# Patient Record
Sex: FEMALE | Race: WHITE | NOT HISPANIC OR LATINO | Employment: FULL TIME | URBAN - NONMETROPOLITAN AREA
[De-identification: names, ages, dates, MRNs, and addresses within clinical notes are randomized per-mention and may not be internally consistent; named-entity substitution may affect disease eponyms.]

---

## 2021-02-12 ENCOUNTER — OFFICE VISIT (OUTPATIENT)
Dept: CARDIOLOGY | Facility: CLINIC | Age: 46
End: 2021-02-12

## 2021-02-12 VITALS
WEIGHT: 146.6 LBS | TEMPERATURE: 98 F | BODY MASS INDEX: 25.03 KG/M2 | DIASTOLIC BLOOD PRESSURE: 100 MMHG | HEIGHT: 64 IN | SYSTOLIC BLOOD PRESSURE: 144 MMHG | HEART RATE: 78 BPM

## 2021-02-12 DIAGNOSIS — R42 DIZZINESS: ICD-10-CM

## 2021-02-12 DIAGNOSIS — I10 ESSENTIAL HYPERTENSION: Primary | ICD-10-CM

## 2021-02-12 DIAGNOSIS — E78.5 HYPERLIPIDEMIA LDL GOAL <100: ICD-10-CM

## 2021-02-12 DIAGNOSIS — R06.02 SHORTNESS OF BREATH: ICD-10-CM

## 2021-02-12 PROCEDURE — 99204 OFFICE O/P NEW MOD 45 MIN: CPT | Performed by: NURSE PRACTITIONER

## 2021-02-12 PROCEDURE — 93000 ELECTROCARDIOGRAM COMPLETE: CPT | Performed by: NURSE PRACTITIONER

## 2021-02-12 RX ORDER — HYDROCHLOROTHIAZIDE 25 MG/1
TABLET ORAL
Qty: 90 TABLET | Refills: 3 | Status: SHIPPED | OUTPATIENT
Start: 2021-02-12 | End: 2021-11-17

## 2021-02-12 RX ORDER — ERGOCALCIFEROL 1.25 MG/1
50000 CAPSULE ORAL WEEKLY
COMMUNITY
End: 2021-08-17

## 2021-02-12 RX ORDER — IBUPROFEN 200 MG
200 TABLET ORAL EVERY 6 HOURS PRN
COMMUNITY
End: 2022-09-21

## 2021-02-12 NOTE — PATIENT INSTRUCTIONS
"Hypertension, Adult  High blood pressure (hypertension) is when the force of blood pumping through the arteries is too strong. The arteries are the blood vessels that carry blood from the heart throughout the body. Hypertension forces the heart to work harder to pump blood and may cause arteries to become narrow or stiff. Untreated or uncontrolled hypertension can cause a heart attack, heart failure, a stroke, kidney disease, and other problems.  A blood pressure reading consists of a higher number over a lower number. Ideally, your blood pressure should be below 120/80. The first (\"top\") number is called the systolic pressure. It is a measure of the pressure in your arteries as your heart beats. The second (\"bottom\") number is called the diastolic pressure. It is a measure of the pressure in your arteries as the heart relaxes.  What are the causes?  The exact cause of this condition is not known. There are some conditions that result in or are related to high blood pressure.  What increases the risk?  Some risk factors for high blood pressure are under your control. The following factors may make you more likely to develop this condition:  · Smoking.  · Having type 2 diabetes mellitus, high cholesterol, or both.  · Not getting enough exercise or physical activity.  · Being overweight.  · Having too much fat, sugar, calories, or salt (sodium) in your diet.  · Drinking too much alcohol.  Some risk factors for high blood pressure may be difficult or impossible to change. Some of these factors include:  · Having chronic kidney disease.  · Having a family history of high blood pressure.  · Age. Risk increases with age.  · Race. You may be at higher risk if you are .  · Gender. Men are at higher risk than women before age 45. After age 65, women are at higher risk than men.  · Having obstructive sleep apnea.  · Stress.  What are the signs or symptoms?  High blood pressure may not cause symptoms. Very high " blood pressure (hypertensive crisis) may cause:  · Headache.  · Anxiety.  · Shortness of breath.  · Nosebleed.  · Nausea and vomiting.  · Vision changes.  · Severe chest pain.  · Seizures.  How is this diagnosed?  This condition is diagnosed by measuring your blood pressure while you are seated, with your arm resting on a flat surface, your legs uncrossed, and your feet flat on the floor. The cuff of the blood pressure monitor will be placed directly against the skin of your upper arm at the level of your heart. It should be measured at least twice using the same arm. Certain conditions can cause a difference in blood pressure between your right and left arms.  Certain factors can cause blood pressure readings to be lower or higher than normal for a short period of time:  · When your blood pressure is higher when you are in a health care provider's office than when you are at home, this is called white coat hypertension. Most people with this condition do not need medicines.  · When your blood pressure is higher at home than when you are in a health care provider's office, this is called masked hypertension. Most people with this condition may need medicines to control blood pressure.  If you have a high blood pressure reading during one visit or you have normal blood pressure with other risk factors, you may be asked to:  · Return on a different day to have your blood pressure checked again.  · Monitor your blood pressure at home for 1 week or longer.  If you are diagnosed with hypertension, you may have other blood or imaging tests to help your health care provider understand your overall risk for other conditions.  How is this treated?  This condition is treated by making healthy lifestyle changes, such as eating healthy foods, exercising more, and reducing your alcohol intake. Your health care provider may prescribe medicine if lifestyle changes are not enough to get your blood pressure under control, and  if:  · Your systolic blood pressure is above 130.  · Your diastolic blood pressure is above 80.  Your personal target blood pressure may vary depending on your medical conditions, your age, and other factors.  Follow these instructions at home:  Eating and drinking    · Eat a diet that is high in fiber and potassium, and low in sodium, added sugar, and fat. An example eating plan is called the DASH (Dietary Approaches to Stop Hypertension) diet. To eat this way:  ? Eat plenty of fresh fruits and vegetables. Try to fill one half of your plate at each meal with fruits and vegetables.  ? Eat whole grains, such as whole-wheat pasta, brown rice, or whole-grain bread. Fill about one fourth of your plate with whole grains.  ? Eat or drink low-fat dairy products, such as skim milk or low-fat yogurt.  ? Avoid fatty cuts of meat, processed or cured meats, and poultry with skin. Fill about one fourth of your plate with lean proteins, such as fish, chicken without skin, beans, eggs, or tofu.  ? Avoid pre-made and processed foods. These tend to be higher in sodium, added sugar, and fat.  · Reduce your daily sodium intake. Most people with hypertension should eat less than 1,500 mg of sodium a day.  · Do not drink alcohol if:  ? Your health care provider tells you not to drink.  ? You are pregnant, may be pregnant, or are planning to become pregnant.  · If you drink alcohol:  ? Limit how much you use to:  § 0-1 drink a day for women.  § 0-2 drinks a day for men.  ? Be aware of how much alcohol is in your drink. In the [place] one drink equals one 12 oz bottle of beer (355 mL), one 5 oz glass of wine (148 mL), or one 1½ oz glass of hard liquor (44 mL).  Lifestyle    · Work with your health care provider to maintain a healthy body weight or to lose weight. Ask what an ideal weight is for you.  · Get at least 30 minutes of exercise most days of the week. Activities may include walking, swimming, or biking.  · Include exercise to  strengthen your muscles (resistance exercise), such as Pilates or lifting weights, as part of your weekly exercise routine. Try to do these types of exercises for 30 minutes at least 3 days a week.  · Do not use any products that contain nicotine or tobacco, such as cigarettes, e-cigarettes, and chewing tobacco. If you need help quitting, ask your health care provider.  · Monitor your blood pressure at home as told by your health care provider.  · Keep all follow-up visits as told by your health care provider. This is important.  Medicines  · Take over-the-counter and prescription medicines only as told by your health care provider. Follow directions carefully. Blood pressure medicines must be taken as prescribed.  · Do not skip doses of blood pressure medicine. Doing this puts you at risk for problems and can make the medicine less effective.  · Ask your health care provider about side effects or reactions to medicines that you should watch for.  Contact a health care provider if you:  · Think you are having a reaction to a medicine you are taking.  · Have headaches that keep coming back (recurring).  · Feel dizzy.  · Have swelling in your ankles.  · Have trouble with your vision.  Get help right away if you:  · Develop a severe headache or confusion.  · Have unusual weakness or numbness.  · Feel faint.  · Have severe pain in your chest or abdomen.  · Vomit repeatedly.  · Have trouble breathing.  Summary  · Hypertension is when the force of blood pumping through your arteries is too strong. If this condition is not controlled, it may put you at risk for serious complications.  · Your personal target blood pressure may vary depending on your medical conditions, your age, and other factors. For most people, a normal blood pressure is less than 120/80.  · Hypertension is treated with lifestyle changes, medicines, or a combination of both. Lifestyle changes include losing weight, eating a healthy, low-sodium diet,  exercising more, and limiting alcohol.  This information is not intended to replace advice given to you by your health care provider. Make sure you discuss any questions you have with your health care provider.  Document Revised: 08/28/2019 Document Reviewed: 08/28/2019  Elsevier Patient Education © 2020 [employer]  Mediterranean Diet  A Mediterranean diet refers to food and lifestyle choices that are based on the traditions of countries located on the [place]. This way of eating has been shown to help prevent certain conditions and improve outcomes for people who have chronic diseases, like kidney disease and heart disease.  What are tips for following this plan?  Lifestyle  · Cook and eat meals together with your family, when possible.  · Drink enough fluid to keep your urine clear or pale yellow.  · Be physically active every day. This includes:  ? Aerobic exercise like running or swimming.  ? Leisure activities like gardening, walking, or housework.  · Get 7-8 hours of sleep each night.  · If recommended by your health care provider, drink red wine in moderation. This means 1 glass a day for nonpregnant women and 2 glasses a day for men. A glass of wine equals 5 oz (150 mL).  Reading food labels    · Check the serving size of packaged foods. For foods such as rice and pasta, the serving size refers to the amount of cooked product, not dry.  · Check the total fat in packaged foods. Avoid foods that have saturated fat or trans fats.  · Check the ingredients list for added sugars, such as corn syrup.  Shopping  · At the grocery store, buy most of your food from the areas near the walls of the store. This includes:  ? Fresh fruits and vegetables (produce).  ? Grains, beans, nuts, and seeds. Some of these may be available in unpackaged forms or large amounts (in bulk).  ? Fresh seafood.  ? Poultry and eggs.  ? Low-fat dairy products.  · Buy whole ingredients instead of prepackaged foods.  · Buy fresh  fruits and vegetables in-season from local farmers markets.  · Buy frozen fruits and vegetables in resealable bags.  · If you do not have access to quality fresh seafood, buy precooked frozen shrimp or canned fish, such as tuna, salmon, or sardines.  · Buy small amounts of raw or cooked vegetables, salads, or olives from the deli or salad bar at your store.  · Stock your pantry so you always have certain foods on hand, such as olive oil, canned tuna, canned tomatoes, rice, pasta, and beans.  Cooking  · Cook foods with extra-virgin olive oil instead of using butter or other vegetable oils.  · Have meat as a side dish, and have vegetables or grains as your main dish. This means having meat in small portions or adding small amounts of meat to foods like pasta or stew.  · Use beans or vegetables instead of meat in common dishes like chili or lasagna.  · [place] with different cooking methods. Try roasting or broiling vegetables instead of steaming or sautéeing them.  · Add frozen vegetables to soups, stews, pasta, or rice.  · Add nuts or seeds for added healthy fat at each meal. You can add these to yogurt, salads, or vegetable dishes.  · Marinate fish or vegetables using olive oil, lemon juice, garlic, and fresh herbs.  Meal planning    · Plan to eat 1 vegetarian meal one day each week. Try to work up to 2 vegetarian meals, if possible.  · Eat seafood 2 or more times a week.  · Have healthy snacks readily available, such as:  ? Vegetable sticks with hummus.  ? Greek yogurt.  ? Fruit and nut trail mix.  · Eat balanced meals throughout the week. This includes:  ? Fruit: 2-3 servings a day  ? Vegetables: 4-5 servings a day  ? Low-fat dairy: 2 servings a day  ? Fish, poultry, or lean meat: 1 serving a day  ? Beans and legumes: 2 or more servings a week  ? Nuts and seeds: 1-2 servings a day  ? Whole grains: 6-8 servings a day  ? Extra-virgin olive oil: 3-4 servings a day  · Limit red meat and sweets to only a few  servings a month  What are my food choices?  · Mediterranean diet  ? Recommended  § Grains: Whole-grain pasta. Brown rice. Bulgar wheat. Polenta. Couscous. Whole-wheat bread. Oatmeal. Quinoa.  § Vegetables: Artichokes. Beets. Broccoli. Cabbage. Carrots. Eggplant. Green beans. Chard. Kale. Spinach. Onions. Leeks. Peas. Squash. Tomatoes. Peppers. Radishes.  § Fruits: Apples. Apricots. Avocado. Berries. Bananas. Cherries. Dates. Figs. Grapes. Zev. Melon. Oranges. Peaches. Plums. Pomegranate.  § Meats and other protein foods: Beans. Almonds. Sunflower seeds. Pine nuts. Peanuts. Cod. [place]. Scallops. Shrimp. Tuna. Tilapia. Clams. Oysters. Eggs.  § Dairy: Low-fat milk. Cheese. Greek yogurt.  § Beverages: Water. Red wine. Herbal tea.  § Fats and oils: Extra virgin olive oil. Avocado oil. Grape seed oil.  § Sweets and desserts: Greek yogurt with honey. Baked apples. Poached pears. Trail mix.  § Seasoning and other foods: Basil. Cilantro. Coriander. Cumin. Mint. Parsley. [place]. Rosemary. Tarragon. Garlic. Oregano. Thyme. Pepper. Balsalmic vinegar. Tahini. Hummus. Tomato sauce. Olives. Mushrooms.  ? Limit these  § Grains: Prepackaged pasta or rice dishes. Prepackaged cereal with added sugar.  § Vegetables: Deep fried potatoes (french fries).  § Fruits: Fruit canned in syrup.  § Meats and other protein foods: Beef. Pork. Lamb. Poultry with skin. Hot dogs. [place].  § Dairy: Ice cream. Sour cream. Whole milk.  § Beverages: Juice. Sugar-sweetened soft drinks. Beer. Liquor and spirits.  § Fats and oils: Butter. Canola oil. Vegetable oil. Beef fat (tallow). Lard.  § Sweets and desserts: Cookies. Cakes. Pies. Candy.  § Seasoning and other foods: Mayonnaise. Premade sauces and marinades.  The items listed may not be a complete list. Talk with your dietitian about what dietary choices are right for you.  Summary  · The Mediterranean diet includes both food and lifestyle choices.  · Eat a variety of fresh fruits and vegetables,  beans, nuts, seeds, and whole grains.  · Limit the amount of red meat and sweets that you eat.  · Talk with your health care provider about whether it is safe for you to drink red wine in moderation. This means 1 glass a day for nonpregnant women and 2 glasses a day for men. A glass of wine equals 5 oz (150 mL).  This information is not intended to replace advice given to you by your health care provider. Make sure you discuss any questions you have with your health care provider.  Document Revised: 08/17/2017 Document Reviewed: 08/10/2017  Elsevier Patient Education © 2020 [employer] Inc[employer]

## 2021-02-12 NOTE — PROGRESS NOTES
"Subjective   Yasmine Berry is a 45 y.o. female     Chief Complaint   Patient presents with   • Establish Care     Patient referred per PCP for new onset of HTN and dizziness. No prior cardiac testing.   • Dizziness     Started noticing about 2 weeks ago, she started monitoring B/P and noticed elevation 130s/94 with average B/P 130/80s. Normal B/P for her 120/70. Having occasional headaches, has been taking Motrin 1-2 times daily.   • Shortness of Breath     Notices when walking up and down stairs and over exertion.   • Aspirin     patient does not take.     Initial cardiac evaluation;    HPI    Ms. Yasmine Berry came in today for her initial cardiac evaluation.  She has no significant  past cardiac history who recently noticed blood pressure increasing. She has scoliosis and recently having more back pain she has treated with once to twice daily ibuprofen. Approximately two weeks ago, she was standing in her kitchen talking to her  when she felt sudden onset dizziness, \"like I was on a [id number]\". Symptoms lasted only a few seconds then subsided. Blood pressure at that time 130's/80's. Since then, she has had a couple more episodes of dizziness with bending over. Denies palpitations, blurred vision, TIA, syncope. Blood pressure readings x 2 weeks range 130-140/70-90. HR in the 70's. She denies having any chest discomfort, diaphoresis, orthopnea, edema. She does note very mild shortness of breath in exertion, occasional snoring and difficulty staying asleep due to stress but no significant symptoms of sleep apnea. Labs per PCP 2/5/21: CMP, CBC normal, TSH 1.42, normal T3, T4, , Tri 76, HDL 49, . Insulin level normal. She is a lifelong nonsmoker. Family history significant for HTN, hyperlipidemia, CAD in her father and TIA in her mother.      Cardiac History  No past surgical history on file.    Current Outpatient Medications   Medication Sig Dispense Refill   • ibuprofen (ADVIL,MOTRIN) 200 MG " tablet Take 200 mg by mouth Every 6 (Six) Hours As Needed for Mild Pain .     • vitamin D (ERGOCALCIFEROL) 1.25 MG (47091 UT) capsule capsule Take 50,000 Units by mouth 1 (One) Time Per Week.     • hydroCHLOROthiazide (HYDRODIURIL) 25 MG tablet Start 1/2 tablet once daily 90 tablet 3     No current facility-administered medications for this visit.      Patient has no known allergies.    Past Medical History:   Diagnosis Date   • Hypertension    • Plantar plate injury, right, initial encounter    • Vitamin D deficiency      Social History     Socioeconomic History   • Marital status:      Spouse name: Not on file   • Number of children: Not on file   • Years of education: Not on file   • Highest education level: Not on file   Tobacco Use   • Smoking status: Never Smoker   • Smokeless tobacco: Never Used   Substance and Sexual Activity   • Alcohol use: Yes     Alcohol/week: 2.0 standard drinks     Types: 2 Glasses of wine per week   • Drug use: Never     Family History   Problem Relation Age of Onset   • Hypertension Mother    • Hyperlipidemia Mother    • Transient ischemic attack Mother 60   • Hypertension Father    • Heart disease Father 50        stent placement   • No Known Problems Sister    • Asthma Brother    • Hypertension Maternal Grandmother    • Diabetes Paternal Grandmother    • Hypertension Paternal Grandmother    • Hyperlipidemia Paternal Grandmother    • Heart attack Paternal Grandfather    • No Known Problems Son    • No Known Problems Daughter      Review of Systems   Constitutional: Negative for activity change, appetite change, fatigue and unexpected weight change.   HENT: Negative for congestion and sinus pressure.    Eyes: Negative.    Respiratory: Negative.    Cardiovascular: Negative.    Gastrointestinal: Negative.    Endocrine: Negative.    Genitourinary: Negative.    Musculoskeletal: Positive for back pain (scoliosis ).   Allergic/Immunologic: Negative.    Neurological: Positive for  "dizziness, light-headedness and headaches. Negative for tremors, seizures, syncope and speech difficulty.   Hematological: Negative.    Psychiatric/Behavioral: Negative.      Diabetes- No  Thyroid- normal    Objective     /100 (BP Location: Left arm)   Pulse 78   Temp 98 °F (36.7 °C)   Ht 162.6 cm (64\")   Wt 66.5 kg (146 lb 9.6 oz)   BMI 25.16 kg/m²     Physical Exam  Vitals signs and nursing note reviewed.   Constitutional:       General: She is not in acute distress.     Appearance: Normal appearance. She is normal weight. She is not ill-appearing, toxic-appearing or diaphoretic.   HENT:      Head: Normocephalic and atraumatic.      Nose: Nose normal.      Mouth/Throat:      Mouth: Mucous membranes are moist.      Pharynx: Oropharynx is clear.   Eyes:      Pupils: Pupils are equal, round, and reactive to light.   Cardiovascular:      Rate and Rhythm: Normal rate and regular rhythm.      Pulses: Normal pulses.           Dorsalis pedis pulses are 2+ on the right side and 2+ on the left side.        Posterior tibial pulses are 2+ on the right side and 2+ on the left side.      Heart sounds: Murmur (1/6 mitral ) present. Systolic murmur present with a grade of 1/6.   Abdominal:      General: Bowel sounds are normal.   Musculoskeletal: Normal range of motion.         General: No swelling.   Skin:     General: Skin is warm and dry.   Neurological:      General: No focal deficit present.      Mental Status: She is alert and oriented to person, place, and time.   Psychiatric:         Mood and Affect: Mood normal.         Behavior: Behavior normal.         ECG 12 Lead    Date/Time: 2/12/2021 10:50 AM  Performed by: Michaela Baeza APRN  Authorized by: Michaela Baeza APRN   Comparison: not compared with previous ECG   Previous ECG: no previous ECG available  Rhythm: sinus rhythm  Rate: normal  BPM: 78  Conduction: incomplete right bundle branch block  ST Segments: ST segments normal    Clinical impression: normal " ECG and non-specific ECG  Comments:  ms  QRS 93 ms  QTc 418 ms           Assessment/Plan     Diagnoses and all orders for this visit:    1. Essential hypertension (Primary)  -     Adult Transthoracic Echo Complete W/ Cont if Necessary Per Protocol; Future  -     US Carotid Bilateral; Future    2. Dizziness  -     Adult Transthoracic Echo Complete W/ Cont if Necessary Per Protocol; Future  -     US Carotid Bilateral; Future    3. Hyperlipidemia LDL goal <100  -     US Carotid Bilateral; Future    4. Shortness of breath  -     Adult Transthoracic Echo Complete W/ Cont if Necessary Per Protocol; Future    Other orders  -     hydroCHLOROthiazide (HYDRODIURIL) 25 MG tablet; Start 1/2 tablet once daily  Dispense: 90 tablet; Refill: 3       Clinically appears stable with no acute changes on EKG. Blood pressure is elevated with home log reviewed.     1. HTN- blood pressure elevated today 140/100. Rechecked at end of visit, remained elevated. Kidney function is normal. Will start low dose thiazide diuretic, HCTZ 25 mg, take 1/2 tablet once daily. Monitor blood pressure. Keep log x 2 weeks and report readings for goal <130/80. This may help with SOB on exertion.  Echocardiogram ordered to evaluate for LVH, mitral regurgitation, and LVEF. If LVH is noted, she may need to be on low dose ace inhibitor. She is advised to avoid NSAID. Can use acetaminophen for musculoskeletal pain. Limit Na to 3661-5071 mg daily. Increase water intake.     2. Dizziness- may be related to blood pressure fluctuation vs vasovagal. Will check carotid US to evaluate carotid and vertebral flow. She denies palpitations and rhythm regular today, so I did not place holter at this time.     3. Hyperlipidemia- mild dyslipidemia with , normal HDL. Encouraged Mediterranean diet, low in saturated fats and carbohydrates, high in protein.     Further recommendations to follow echo, carotid US, and response to antihypertensive treatment. Follow up in  six months or sooner if needed.

## 2021-02-17 ENCOUNTER — HOSPITAL ENCOUNTER (OUTPATIENT)
Dept: CARDIOLOGY | Facility: HOSPITAL | Age: 46
End: 2021-02-17

## 2021-02-17 ENCOUNTER — APPOINTMENT (OUTPATIENT)
Dept: CARDIOLOGY | Facility: HOSPITAL | Age: 46
End: 2021-02-17

## 2021-02-24 ENCOUNTER — HOSPITAL ENCOUNTER (OUTPATIENT)
Dept: CARDIOLOGY | Facility: HOSPITAL | Age: 46
Discharge: HOME | End: 2021-02-24

## 2021-02-24 DIAGNOSIS — I10 ESSENTIAL HYPERTENSION: ICD-10-CM

## 2021-02-24 DIAGNOSIS — R42 DIZZINESS: ICD-10-CM

## 2021-02-24 DIAGNOSIS — R06.02 SHORTNESS OF BREATH: ICD-10-CM

## 2021-02-24 DIAGNOSIS — E78.5 HYPERLIPIDEMIA LDL GOAL <100: ICD-10-CM

## 2021-02-24 LAB
BH CV ECHO MEAS - ACS: 1.9 CM
BH CV ECHO MEAS - AO MAX PG: 6.4 MMHG
BH CV ECHO MEAS - AO MEAN PG: 4 MMHG
BH CV ECHO MEAS - AO ROOT AREA (BSA CORRECTED): 1.5
BH CV ECHO MEAS - AO ROOT AREA: 5.5 CM^2
BH CV ECHO MEAS - AO ROOT DIAM: 2.7 CM
BH CV ECHO MEAS - AO V2 MAX: 126 CM/SEC
BH CV ECHO MEAS - AO V2 MEAN: 89.2 CM/SEC
BH CV ECHO MEAS - AO V2 VTI: 30 CM
BH CV ECHO MEAS - BSA(HAYCOCK): 1.7 M^2
BH CV ECHO MEAS - BSA: 1.7 M^2
BH CV ECHO MEAS - BZI_BMI: 25.1 KILOGRAMS/M^2
BH CV ECHO MEAS - BZI_METRIC_HEIGHT: 162.6 CM
BH CV ECHO MEAS - BZI_METRIC_WEIGHT: 66.2 KG
BH CV ECHO MEAS - EDV(CUBED): 51.5 ML
BH CV ECHO MEAS - EDV(MOD-SP4): 81.9 ML
BH CV ECHO MEAS - EDV(TEICH): 58.9 ML
BH CV ECHO MEAS - EF(CUBED): 83.3 %
BH CV ECHO MEAS - EF(MOD-SP2): 76.9 %
BH CV ECHO MEAS - EF(MOD-SP4): 62.5 %
BH CV ECHO MEAS - EF(TEICH): 77 %
BH CV ECHO MEAS - ESV(CUBED): 8.6 ML
BH CV ECHO MEAS - ESV(MOD-SP4): 30.7 ML
BH CV ECHO MEAS - ESV(TEICH): 13.6 ML
BH CV ECHO MEAS - FS: 44.9 %
BH CV ECHO MEAS - IVS/LVPW: 1.1
BH CV ECHO MEAS - IVSD: 1.1 CM
BH CV ECHO MEAS - LA DIMENSION: 3.4 CM
BH CV ECHO MEAS - LA/AO: 1.3
BH CV ECHO MEAS - LV DIASTOLIC VOL/BSA (35-75): 47.9 ML/M^2
BH CV ECHO MEAS - LV IVRT: 0.11 SEC
BH CV ECHO MEAS - LV MASS(C)D: 130.4 GRAMS
BH CV ECHO MEAS - LV MASS(C)DI: 76.2 GRAMS/M^2
BH CV ECHO MEAS - LV SYSTOLIC VOL/BSA (12-30): 17.9 ML/M^2
BH CV ECHO MEAS - LVIDD: 3.7 CM
BH CV ECHO MEAS - LVIDS: 2.1 CM
BH CV ECHO MEAS - LVLD AP4: 7.3 CM
BH CV ECHO MEAS - LVLS AP4: 6.3 CM
BH CV ECHO MEAS - LVOT AREA (M): 3.5 CM^2
BH CV ECHO MEAS - LVOT AREA: 3.5 CM^2
BH CV ECHO MEAS - LVOT DIAM: 2.1 CM
BH CV ECHO MEAS - LVPWD: 1.1 CM
BH CV ECHO MEAS - MV A MAX VEL: 36 CM/SEC
BH CV ECHO MEAS - MV DEC SLOPE: 395 CM/SEC^2
BH CV ECHO MEAS - MV E MAX VEL: 100 CM/SEC
BH CV ECHO MEAS - MV E/A: 2.8
BH CV ECHO MEAS - RAP SYSTOLE: 10 MMHG
BH CV ECHO MEAS - RVDD: 2.8 CM
BH CV ECHO MEAS - RVSP: 27 MMHG
BH CV ECHO MEAS - SI(AO): 96.7 ML/M^2
BH CV ECHO MEAS - SI(CUBED): 25 ML/M^2
BH CV ECHO MEAS - SI(MOD-SP4): 29.9 ML/M^2
BH CV ECHO MEAS - SI(TEICH): 26.5 ML/M^2
BH CV ECHO MEAS - SV(AO): 165.5 ML
BH CV ECHO MEAS - SV(CUBED): 42.9 ML
BH CV ECHO MEAS - SV(MOD-SP4): 51.2 ML
BH CV ECHO MEAS - SV(TEICH): 45.3 ML
BH CV ECHO MEAS - TR MAX VEL: 206 CM/SEC
MAXIMAL PREDICTED HEART RATE: 175 BPM
STRESS TARGET HR: 149 BPM

## 2021-02-24 PROCEDURE — 93356 MYOCRD STRAIN IMG SPCKL TRCK: CPT | Performed by: INTERNAL MEDICINE

## 2021-02-24 PROCEDURE — 93306 TTE W/DOPPLER COMPLETE: CPT

## 2021-02-24 PROCEDURE — 93880 EXTRACRANIAL BILAT STUDY: CPT

## 2021-02-24 PROCEDURE — 93306 TTE W/DOPPLER COMPLETE: CPT | Performed by: INTERNAL MEDICINE

## 2021-02-24 PROCEDURE — 93880 EXTRACRANIAL BILAT STUDY: CPT | Performed by: RADIOLOGY

## 2021-02-24 PROCEDURE — 93356 MYOCRD STRAIN IMG SPCKL TRCK: CPT

## 2021-08-17 ENCOUNTER — OFFICE VISIT (OUTPATIENT)
Dept: CARDIOLOGY | Facility: CLINIC | Age: 46
End: 2021-08-17

## 2021-08-17 VITALS
WEIGHT: 132.8 LBS | BODY MASS INDEX: 22.67 KG/M2 | SYSTOLIC BLOOD PRESSURE: 120 MMHG | DIASTOLIC BLOOD PRESSURE: 80 MMHG | HEIGHT: 64 IN | HEART RATE: 68 BPM

## 2021-08-17 DIAGNOSIS — E78.5 DYSLIPIDEMIA: ICD-10-CM

## 2021-08-17 DIAGNOSIS — I10 ESSENTIAL HYPERTENSION: Primary | ICD-10-CM

## 2021-08-17 DIAGNOSIS — R42 DIZZINESS: ICD-10-CM

## 2021-08-17 PROBLEM — M41.9 SCOLIOSIS: Status: ACTIVE | Noted: 2021-08-17

## 2021-08-17 PROCEDURE — 99213 OFFICE O/P EST LOW 20 MIN: CPT | Performed by: NURSE PRACTITIONER

## 2021-08-17 NOTE — PROGRESS NOTES
Chief Complaint   Patient presents with   • Follow-up     Cardiac management   • Lab     Last labs in chart.   • Blood pressure     Doing better, 104/72.    • Dizziness     Doing much better.    • Med Refill     Does not need refills at this time.   • Weight loss     Has been watching diet closely and more active.      Subjective       Yasmine Berry is a 46 y.o. female with no significant past cardiac history who was referred for evaluation  in February 2021 secondary to dizziness and mildly elevated blood pressure.  She has scoliosis with back pain managed with ibuprofen.  She was encouraged to decrease NSAID use, decrease sodium intake.  HCTZ 12.5-25 mg prescribed for /100 at initial visit.  She underwent echocardiogram which showed no LVH, normal LVEF, no significant valvular dysfunction.  Carotid ultrasound showed no stenosis or tortuosity.  Mediterranean diet recommended for .    She returns today for follow-up.  Dizziness has improved.  She has been following a stricter diet with intermittent fasting, avoiding gluten and dairy and has lost 14 pounds.  She is also exercising with HIIT.  Blood pressure has been well controlled and she uses HCTZ 12.5 mg as needed.       Cardiac History:    Past Surgical History:   Procedure Laterality Date   • ECHO - CONVERTED  02/24/2021    EF 70%. Trace-Mild MR. RVSP- 27 mmHg     Current Outpatient Medications   Medication Sig Dispense Refill   • hydroCHLOROthiazide (HYDRODIURIL) 25 MG tablet Start 1/2 tablet once daily (Patient taking differently: Start 1/2 tablet once daily as needed) 90 tablet 3   • ibuprofen (ADVIL,MOTRIN) 200 MG tablet Take 200 mg by mouth Every 6 (Six) Hours As Needed for Mild Pain .       No current facility-administered medications for this visit.     Patient has no known allergies.    Past Medical History:   Diagnosis Date   • Hypertension    • Plantar plate injury, right, initial encounter    • Vitamin D deficiency      Social History  "    Socioeconomic History   • Marital status:      Spouse name: Not on file   • Number of children: Not on file   • Years of education: Not on file   • Highest education level: Not on file   Tobacco Use   • Smoking status: Never Smoker   • Smokeless tobacco: Never Used   Vaping Use   • Vaping Use: Never used   Substance and Sexual Activity   • Alcohol use: Yes     Alcohol/week: 2.0 standard drinks     Types: 2 Glasses of wine per week   • Drug use: Never     Family History   Problem Relation Age of Onset   • Hypertension Mother    • Hyperlipidemia Mother    • Transient ischemic attack Mother 60   • Hypertension Father    • Heart disease Father 50        stent placement   • No Known Problems Sister    • Asthma Brother    • Hypertension Maternal Grandmother    • Diabetes Paternal Grandmother    • Hypertension Paternal Grandmother    • Hyperlipidemia Paternal Grandmother    • Heart attack Paternal Grandfather    • No Known Problems Son    • No Known Problems Daughter      Review of Systems   Constitutional: Positive for weight loss (Down 14 pounds). Negative for decreased appetite and malaise/fatigue.   HENT: Negative.    Eyes: Negative for blurred vision.   Cardiovascular: Negative for chest pain, dyspnea on exertion, leg swelling, palpitations and syncope.   Respiratory: Negative for shortness of breath and sleep disturbances due to breathing.    Endocrine: Negative.    Hematologic/Lymphatic: Negative for bleeding problem. Does not bruise/bleed easily.   Skin: Negative.    Musculoskeletal: Negative for falls and myalgias.   Gastrointestinal: Negative for abdominal pain, heartburn and melena.   Genitourinary: Negative for hematuria.   Neurological: Positive for dizziness (Improved). Negative for light-headedness.   Psychiatric/Behavioral: Negative for altered mental status.   Allergic/Immunologic: Negative.       Objective     /80 (BP Location: Right arm)   Pulse 68   Ht 162.6 cm (64.02\")   Wt 60.2 kg " (132 lb 12.8 oz)   BMI 22.78 kg/m²     Vitals and nursing note reviewed.   Constitutional:       General: Not in acute distress.     Appearance: Well-developed. Not diaphoretic.   Eyes:      Pupils: Pupils are equal, round, and reactive to light.   HENT:      Head: Normocephalic.   Pulmonary:      Effort: Pulmonary effort is normal. No respiratory distress.      Breath sounds: Normal breath sounds.   Cardiovascular:      Normal rate. Regular rhythm.   Pulses:     Intact distal pulses.   Edema:     Peripheral edema absent.   Abdominal:      General: Bowel sounds are normal.      Palpations: Abdomen is soft.   Musculoskeletal: Normal range of motion.      Cervical back: Normal range of motion. Skin:     General: Skin is warm and dry.   Neurological:      Mental Status: Alert and oriented to person, place, and time.        Procedures          Problem List Items Addressed This Visit        Cardiac and Vasculature    Essential hypertension - Primary    Dyslipidemia       Symptoms and Signs    Dizziness         Blood pressure normal today at 120/80.  Continue HCTZ 12.5 mg as needed.    Lipids borderline elevated with , normal HDL.  Continue heart healthy diet.  She was congratulated on her weight loss.    Dizziness-improved.  Echocardiogram and carotid ultrasound reviewed with her showing no valvular heart disease, no carotid or vertebral obstruction.  If she develops recurrent symptoms, consider PT for vertigo management.    She appears stable.  No further work-up indicated at this time.  Follow-up in 1 year or sooner as needed.    Patient's Body mass index is 22.78 kg/m². indicating that she is within normal range (BMI 18.5-24.9). No BMI management plan needed..               Electronically signed by ANGE Guajardo,  August 17, 2021 10:36 EDT

## 2021-11-17 ENCOUNTER — OFFICE VISIT (OUTPATIENT)
Dept: OBSTETRICS AND GYNECOLOGY | Facility: CLINIC | Age: 46
End: 2021-11-17

## 2021-11-17 VITALS
DIASTOLIC BLOOD PRESSURE: 82 MMHG | WEIGHT: 132.2 LBS | BODY MASS INDEX: 22.57 KG/M2 | SYSTOLIC BLOOD PRESSURE: 138 MMHG | HEIGHT: 64 IN

## 2021-11-17 DIAGNOSIS — N63.0 BREAST MASS IN FEMALE: Primary | ICD-10-CM

## 2021-11-17 PROCEDURE — 99213 OFFICE O/P EST LOW 20 MIN: CPT | Performed by: NURSE PRACTITIONER

## 2021-11-17 NOTE — PROGRESS NOTES
"Chief Complaint  Yasmine Berry is a 46 y.o.  female presenting for Breast Problem (Establish care.  patient with an area of puffiness/soreness in the right breast.)    History of Present Illness  Very pleasant longtime pt presents for a problem visit.  She is 45yo,  who had normal contrast enhanced mammo SEPT .  Feels new palpable mass in the Right UOQ/ axilla.  LMP 2021.  Vas for BC.      The following portions of the patient's history were reviewed and updated as appropriate: allergies, current medications, past family history, past medical history, past social history, past surgical history and problem list.    No Known Allergies      Current Outpatient Medications:   •  hydroCHLOROthiazide (HYDRODIURIL) 25 MG tablet, Start 1/2 tablet once daily (Patient taking differently: Start 1/2 tablet once daily as needed), Disp: 90 tablet, Rfl: 3  •  ibuprofen (ADVIL,MOTRIN) 200 MG tablet, Take 200 mg by mouth Every 6 (Six) Hours As Needed for Mild Pain ., Disp: , Rfl:     Past Medical History:   Diagnosis Date   • Hypertension    • Plantar plate injury, right, initial encounter    • Vitamin D deficiency         Past Surgical History:   Procedure Laterality Date   • ECHO - CONVERTED  2021    EF 70%. Trace-Mild MR. RVSP- 27 mmHg   • PLANTAR FASCIA SURGERY Right        Objective  /82   Ht 162.6 cm (64\")   Wt 60 kg (132 lb 3.2 oz)   LMP 2021 (Exact Date)   Breastfeeding No   BMI 22.69 kg/m²     Physical Exam    Left breast without masses, nipple dc, or lymphadenopathy  Right breast with a soft and mobile, but somewhat ill-defined mass in the UOQ extending into the axilla.  No nipple dc.  No lymphadenopathy.    Assessment/Plan   Diagnoses and all orders for this visit:    1. Breast mass in female (Primary)  -     Mammo Diagnostic Digital Tomosynthesis Right With CAD; Future      Will contact [facility] (who has done all previous [facility]) and see if she can be worked in " today).  Procedures        Return in about 3 months (around 2/17/2022) for Recheck clinical breast exam.    Yani Ruano, APRN  11/17/2021

## 2022-02-07 ENCOUNTER — OFFICE VISIT (OUTPATIENT)
Dept: INTERNAL MEDICINE | Facility: CLINIC | Age: 47
End: 2022-02-07

## 2022-02-07 VITALS
HEIGHT: 64 IN | BODY MASS INDEX: 22.3 KG/M2 | SYSTOLIC BLOOD PRESSURE: 118 MMHG | TEMPERATURE: 97.5 F | DIASTOLIC BLOOD PRESSURE: 86 MMHG | WEIGHT: 130.6 LBS | HEART RATE: 100 BPM

## 2022-02-07 DIAGNOSIS — R20.2 PARESTHESIA AND PAIN OF BOTH UPPER EXTREMITIES: ICD-10-CM

## 2022-02-07 DIAGNOSIS — Z13.220 LIPID SCREENING: ICD-10-CM

## 2022-02-07 DIAGNOSIS — N92.6 IRREGULAR MENSES: ICD-10-CM

## 2022-02-07 DIAGNOSIS — M79.602 PARESTHESIA AND PAIN OF BOTH UPPER EXTREMITIES: ICD-10-CM

## 2022-02-07 DIAGNOSIS — M79.601 PARESTHESIA AND PAIN OF BOTH UPPER EXTREMITIES: ICD-10-CM

## 2022-02-07 DIAGNOSIS — Z13.21 ENCOUNTER FOR VITAMIN DEFICIENCY SCREENING: ICD-10-CM

## 2022-02-07 DIAGNOSIS — F51.01 PRIMARY INSOMNIA: Primary | ICD-10-CM

## 2022-02-07 PROBLEM — I10 ESSENTIAL HYPERTENSION: Status: RESOLVED | Noted: 2021-02-12 | Resolved: 2022-02-07

## 2022-02-07 PROCEDURE — 99204 OFFICE O/P NEW MOD 45 MIN: CPT | Performed by: PHYSICIAN ASSISTANT

## 2022-02-07 RX ORDER — HYDRALAZINE HYDROCHLORIDE 25 MG/1
25 TABLET, FILM COATED ORAL 3 TIMES DAILY
Qty: 30 TABLET | Refills: 1 | Status: SHIPPED | OUTPATIENT
Start: 2022-02-07 | End: 2022-02-08

## 2022-02-07 RX ORDER — HYDROCHLOROTHIAZIDE 25 MG/1
12.5 TABLET ORAL DAILY
COMMUNITY
Start: 2021-12-22 | End: 2022-02-07

## 2022-02-07 NOTE — PROGRESS NOTES
Patient Care Team:  La Charles PA-C as PCP - General (Physician Assistant)  Yani Ruano APRN as Nurse Practitioner (Obstetrics and Gynecology)    Chief Complaint::   Chief Complaint   Patient presents with   • dyslipidemia     here to establish care        Subjective     HPI  Yasmine is a 46 year old female who presents to establish care. Born and raised in [place], currently lives in [place].  Yasmine went to [employer], but graduated from [employer].  She is , has 2 children son age 23 and vet school at [employer], and an 18-year-old daughter who is at [employer].  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      Family history:  Father-HLP. MI @ 50s  Mother-HTN, TIA x 2.  Arthritis  PGM-Diabetes  Older sister, younger brother, PMH unremarkable.    Surgical history:  2018-plantar plate repair on right foot. Surgeon-at     Past medical history:  Scoliosis, mild  Had a spell last February-dizziness with frequent falling.  She was diagnosed with hypertension.  Has since lost 16 pounds and discontinued hydrochlorothiazide altogether.  She denies headaches, chest pain, palpitations, or dizziness.    Exercises-strength training and HIT training 5 days a week. Home  Intermittent fasting-16, she does not snack at night.  Low carbs two days a week.     She has experienced numbness in both arms. Has been evaluated by chiropractor, no issues noted.  She reports her pain is in the right elbow and left thumb.  However, she wakes up with numbness and tingling in both arms.    Dense breasts, diagnostic MMG,OBGYN-was seeing Mallory CASSIDY. In October, found lump in her breast right breast and axillary area.  She reports the mass was tender and approximately the size of a golf ball.  Underwent imaging with contrast, reassurance provided.  She is due for follow-up with gynecology next month and will have repeat  "mammogram ordered.    Irregular menses, may be every 6 to 8 weeks.  She denies pelvic pain, dysuria, dyspareunia.  Difficulty sleeping.  Has tried arthritis PIM medication which helps.  Able to fall asleep without difficulty, unable to stay asleep without waking up.  She reports her mind races.      The following portions of the patient's history were reviewed and updated as appropriate: active problem list, medication list, allergies, family history, social history    Review of Systems:   Review of Systems   Constitutional: Positive for unexpected weight loss. Negative for activity change, appetite change, diaphoresis, fatigue and unexpected weight gain.   HENT: Negative for hearing loss.    Eyes: Negative for visual disturbance.   Respiratory: Negative for chest tightness and shortness of breath.    Cardiovascular: Negative for chest pain, palpitations and leg swelling.   Gastrointestinal: Negative for abdominal pain, blood in stool, GERD and indigestion.   Endocrine: Negative for cold intolerance and heat intolerance.   Genitourinary: Positive for menstrual problem. Negative for dysuria, hematuria, vaginal bleeding and vaginal discharge.   Musculoskeletal: Positive for arthralgias. Negative for joint swelling and myalgias.   Skin: Negative for skin lesions.   Neurological: Negative for tremors, seizures, syncope, speech difficulty, weakness, headache, memory problem and confusion.   Hematological: Does not bruise/bleed easily.   Psychiatric/Behavioral: Positive for sleep disturbance. Negative for depressed mood. The patient is not nervous/anxious.        Vital Signs  Vitals:    02/07/22 1342   BP: 118/86   BP Location: Left arm   Patient Position: Sitting   Cuff Size: Adult   Pulse: 100   Temp: 97.5 °F (36.4 °C)   TempSrc: Infrared   Weight: 59.2 kg (130 lb 9.6 oz)   Height: 162.6 cm (64\")   PainSc: 0-No pain     Body mass index is 22.42 kg/m².    Labs  No visits with results within 3 Month(s) from this visit. "   Latest known visit with results is:   Hospital Outpatient Visit on 02/24/2021   Component Date Value Ref Range Status   • BSA 02/24/2021 1.7  m^2 Final   • RVIDd 02/24/2021 2.8  cm Final   • IVSd 02/24/2021 1.1  cm Final   • LVIDd 02/24/2021 3.7  cm Final   • LVIDs 02/24/2021 2.1  cm Final   • LVPWd 02/24/2021 1.1  cm Final   • IVS/LVPW 02/24/2021 1.1   Final   • FS 02/24/2021 44.9  % Final   • EDV(Teich) 02/24/2021 58.9  ml Final   • ESV(Teich) 02/24/2021 13.6  ml Final   • EF(Teich) 02/24/2021 77.0  % Final   • EDV(cubed) 02/24/2021 51.5  ml Final   • ESV(cubed) 02/24/2021 8.6  ml Final   • EF(cubed) 02/24/2021 83.3  % Final   • LV mass(C)d 02/24/2021 130.4  grams Final   • LV mass(C)dI 02/24/2021 76.2  grams/m^2 Final   • SV(Teich) 02/24/2021 45.3  ml Final   • SI(Teich) 02/24/2021 26.5  ml/m^2 Final   • SV(cubed) 02/24/2021 42.9  ml Final   • SI(cubed) 02/24/2021 25.0  ml/m^2 Final   • Ao root diam 02/24/2021 2.7  cm Final   • Ao root area 02/24/2021 5.5  cm^2 Final   • ACS 02/24/2021 1.9  cm Final   • LA dimension 02/24/2021 3.4  cm Final   • LA/Ao 02/24/2021 1.3   Final   • LVOT diam 02/24/2021 2.1  cm Final   • LVOT area 02/24/2021 3.5  cm^2 Final   • LVOT area(traced) 02/24/2021 3.5  cm^2 Final   • LVLd ap4 02/24/2021 7.3  cm Final   • EDV(MOD-sp4) 02/24/2021 81.9  ml Final   • LVLs ap4 02/24/2021 6.3  cm Final   • ESV(MOD-sp4) 02/24/2021 30.7  ml Final   • EF(MOD-sp4) 02/24/2021 62.5  % Final   • SV(MOD-sp4) 02/24/2021 51.2  ml Final   • SI(MOD-sp4) 02/24/2021 29.9  ml/m^2 Final   • Ao root area (BSA corrected) 02/24/2021 1.5   Final   • LV Candelaria Vol (BSA corrected) 02/24/2021 47.9  ml/m^2 Final   • LV Sys Vol (BSA corrected) 02/24/2021 17.9  ml/m^2 Final   • MV E max faina 02/24/2021 100.0  cm/sec Final   • MV A max faina 02/24/2021 36.0  cm/sec Final   • MV E/A 02/24/2021 2.8   Final   • LV IVRT 02/24/2021 0.11  sec Final   • MV dec slope 02/24/2021 395.0  cm/sec^2 Final   • Ao pk faina 02/24/2021 126.0   cm/sec Final   • Ao max PG 02/24/2021 6.4  mmHg Final   • Ao V2 mean 02/24/2021 89.2  cm/sec Final   • Ao mean PG 02/24/2021 4.0  mmHg Final   • Ao V2 VTI 02/24/2021 30.0  cm Final   • SV(Ao) 02/24/2021 165.5  ml Final   • SI(Ao) 02/24/2021 96.7  ml/m^2 Final   • TR max faina 02/24/2021 206.0  cm/sec Final   • RVSP(TR) 02/24/2021 27.0  mmHg Final   • RAP systole 02/24/2021 10.0  mmHg Final   • BH CV ECHO MARYAM - BZI_BMI 02/24/2021 25.1  kilograms/m^2 Final   • BH CV ECHO MARYAM - BSA(HAYCOCK) 02/24/2021 1.7  m^2 Final   • BH CV ECHO MARYAM - BZI_METRIC_WEIGHT 02/24/2021 66.2  kg Final   • BH CV ECHO MARYAM - BZI_METRIC_HEIGHT 02/24/2021 162.6  cm Final   • Target HR (85%) 02/24/2021 149  bpm Final   • Max. Pred. HR (100%) 02/24/2021 175  bpm Final   • EF(MOD-sp2) 02/24/2021 76.9  % Final       Imaging  No radiology results for the last 30 days.      Current Outpatient Medications:   •  ibuprofen (ADVIL,MOTRIN) 200 MG tablet, Take 200 mg by mouth Every 6 (Six) Hours As Needed for Mild Pain ., Disp: , Rfl:   •  Diclofenac Sodium (VOLTAREN) 1 % gel gel, Apply 4 g topically to the appropriate area as directed 3 (Three) Times a Day., Disp: 50 g, Rfl: 1  •  hydrALAZINE (APRESOLINE) 25 MG tablet, Take 1 tablet by mouth 3 (Three) Times a Day., Disp: 30 tablet, Rfl: 1    Physical Exam:    Physical Exam  Vitals reviewed.   Constitutional:       Appearance: Normal appearance. She is well-developed.   HENT:      Head: Normocephalic and atraumatic.      Right Ear: Hearing, tympanic membrane, ear canal and external ear normal.      Left Ear: Hearing, tympanic membrane, ear canal and external ear normal.      Nose: Nose normal.      Mouth/Throat:      Mouth: Mucous membranes are moist.      Pharynx: Oropharynx is clear. Uvula midline. No posterior oropharyngeal erythema.   Eyes:      General: Lids are normal.      Conjunctiva/sclera: Conjunctivae normal.      Pupils: Pupils are equal, round, and reactive to light.   Cardiovascular:       Rate and Rhythm: Normal rate and regular rhythm.      Heart sounds: Normal heart sounds.   Pulmonary:      Effort: Pulmonary effort is normal.      Breath sounds: Normal breath sounds.   Abdominal:      General: Bowel sounds are normal.      Palpations: Abdomen is soft.   Musculoskeletal:         General: Normal range of motion.      Cervical back: Full passive range of motion without pain, normal range of motion and neck supple.      Right lower leg: No edema.      Left lower leg: No edema.   Skin:     General: Skin is warm and dry.   Neurological:      General: No focal deficit present.      Mental Status: She is alert and oriented to person, place, and time. Mental status is at baseline.      Deep Tendon Reflexes: Reflexes are normal and symmetric.   Psychiatric:         Mood and Affect: Mood normal.         Speech: Speech normal.         Behavior: Behavior normal.         Thought Content: Thought content normal.         Judgment: Judgment normal.         Procedures        Assessment/Plan   Problem List Items Addressed This Visit        Cardiac and Vasculature    Lipid screening    Relevant Orders    Lipid Panel       Endocrine and Metabolic    Encounter for vitamin deficiency screening    Relevant Orders    Vitamin D 25 Hydroxy       Genitourinary and Reproductive     Irregular menses    Overview     Will check thyroid and FSH.         Relevant Orders    TSH    T4, Free    T3, Free    Follicle Stimulating Hormone       Sleep    Primary insomnia - Primary    Overview     Unsure if this is related to hormones.  Trial of hydroxyzine 25 mg nightly.           Relevant Orders    CBC & Differential    Comprehensive Metabolic Panel       Symptoms and Signs    Paresthesia and pain of both upper extremities    Overview     Order for sed rate and B12 placed.  Reviewed exercises that she performs at home, such as push-ups or other offending exercises.  Patient reports no pain when performing these regular exercises.          Relevant Orders    Vitamin B12    Sedimentation Rate          Return in about 6 weeks (around 3/21/2022) for ROUTINE PHYSICAL.    Plan of care reviewed with patient at the conclusion of today's visit. Education was provided regarding diagnosis, management, and any prescribed or recommended OTC medications.Patient verbalizes understanding of and agreement with management plan.     I spent 39 minutes caring for Yasmine on this date of service. This time includes time spent by me in the following activities:preparing for the visit, reviewing tests, obtaining and/or reviewing a separately obtained history, performing a medically appropriate examination and/or evaluation , counseling and educating the patient/family/caregiver, ordering medications, tests, or procedures and documenting information in the medical record    La Charles PA-C    Please note that portions of this note were completed with a voice recognition program.

## 2022-02-08 ENCOUNTER — TELEPHONE (OUTPATIENT)
Dept: INTERNAL MEDICINE | Facility: CLINIC | Age: 47
End: 2022-02-08

## 2022-02-08 RX ORDER — HYDROXYZINE HYDROCHLORIDE 25 MG/1
25 TABLET, FILM COATED ORAL 3 TIMES DAILY PRN
Qty: 30 TABLET | Refills: 0 | Status: SHIPPED | OUTPATIENT
Start: 2022-02-08 | End: 2022-03-17

## 2022-02-08 NOTE — TELEPHONE ENCOUNTER
Caller: Yasmine Berry    Relationship: Self    Best call back number: [phone]     What medications are you currently taking:   Current Outpatient Medications on File Prior to Visit   Medication Sig Dispense Refill   • Diclofenac Sodium (VOLTAREN) 1 % gel gel Apply 4 g topically to the appropriate area as directed 3 (Three) Times a Day. 50 g 1   • hydrALAZINE (APRESOLINE) 25 MG tablet Take 1 tablet by mouth 3 (Three) Times a Day. 30 tablet 1   • ibuprofen (ADVIL,MOTRIN) 200 MG tablet Take 200 mg by mouth Every 6 (Six) Hours As Needed for Mild Pain .       No current facility-administered medications on file prior to visit.          When did you start taking these medications: YESTERDAY    Which medication are you concerned about: hydrALAZINE (APRESOLINE) 25 MG tablet    Who prescribed you this medication: RITCHIE LANCASTER    What are your concerns: PATIENT STATED THAT SHE BELIEVES THIS MEDICATION IS FOR HIGH BLOOD PRESSURE.  PATIENT STAED THAT THIS IS NOT WHAT THEY DISCUSSED.  PATIENT STATED THAT SHE IS FEELING SLUGGISH AND NOT HERSELF.    How long have you had these concerns:SINCE TAKING THE MEDICINE

## 2022-02-08 NOTE — TELEPHONE ENCOUNTER
"Called and checked in with patient at 1:58 PM.  Reports feeling \"much improved.\"  Less drowsiness and fatigue.  Reassurance provided.  "

## 2022-02-08 NOTE — TELEPHONE ENCOUNTER
I have called and discussed with patient.  Discontinue hydralazine immediately. She expressed understanding.  I will call to check on her later today. New prescription of hydroxyzine sent to pharmacy.  She may start that tomorrow night.

## 2022-02-08 NOTE — TELEPHONE ENCOUNTER
I have called patient to correct medication from hydralazine to hydroxyzine.  Patient expressed understanding of which medication to take.   Precautions reviewed I will call later today to check on her.

## 2022-02-09 ENCOUNTER — TELEPHONE (OUTPATIENT)
Dept: INTERNAL MEDICINE | Facility: CLINIC | Age: 47
End: 2022-02-09

## 2022-02-09 NOTE — TELEPHONE ENCOUNTER
"Called patient for update.  She reports \"feeling normal, back to herself.\"  Blood pressure this morning 105/75.  She denies headache, dizziness, or fatigue.    Hydroxyzine had been sent to pharmacy.  She can take 1 pill nightly for sleep.  She expressed understanding.  Recommend waiting another night or two before trying new medication.     Encouraged patient to call me with any other questions or concerns.  She did have additional questions regarding preparation for her fasting labs later this week otherwise, no other concerns.  "

## 2022-02-16 ENCOUNTER — OFFICE VISIT (OUTPATIENT)
Dept: OBSTETRICS AND GYNECOLOGY | Facility: CLINIC | Age: 47
End: 2022-02-16

## 2022-02-16 ENCOUNTER — LAB (OUTPATIENT)
Dept: LAB | Facility: HOSPITAL | Age: 47
End: 2022-02-16

## 2022-02-16 VITALS
SYSTOLIC BLOOD PRESSURE: 112 MMHG | WEIGHT: 130.51 LBS | DIASTOLIC BLOOD PRESSURE: 78 MMHG | HEIGHT: 64 IN | BODY MASS INDEX: 22.28 KG/M2

## 2022-02-16 DIAGNOSIS — M79.602 PARESTHESIA AND PAIN OF BOTH UPPER EXTREMITIES: ICD-10-CM

## 2022-02-16 DIAGNOSIS — R20.2 PARESTHESIA AND PAIN OF BOTH UPPER EXTREMITIES: ICD-10-CM

## 2022-02-16 DIAGNOSIS — N60.19 FIBROCYSTIC BREAST DISEASE (FCBD), UNSPECIFIED LATERALITY: ICD-10-CM

## 2022-02-16 DIAGNOSIS — Z13.220 LIPID SCREENING: ICD-10-CM

## 2022-02-16 DIAGNOSIS — Z13.21 ENCOUNTER FOR VITAMIN DEFICIENCY SCREENING: ICD-10-CM

## 2022-02-16 DIAGNOSIS — Z09 FOLLOW UP: Primary | ICD-10-CM

## 2022-02-16 DIAGNOSIS — N92.6 IRREGULAR MENSES: ICD-10-CM

## 2022-02-16 DIAGNOSIS — F51.01 PRIMARY INSOMNIA: ICD-10-CM

## 2022-02-16 DIAGNOSIS — M79.601 PARESTHESIA AND PAIN OF BOTH UPPER EXTREMITIES: ICD-10-CM

## 2022-02-16 LAB
ALBUMIN SERPL-MCNC: 4.5 G/DL (ref 3.5–5.2)
ALBUMIN/GLOB SERPL: 2.5 G/DL
ALP SERPL-CCNC: 55 U/L (ref 39–117)
ALT SERPL W P-5'-P-CCNC: 14 U/L (ref 1–33)
ANION GAP SERPL CALCULATED.3IONS-SCNC: 8.4 MMOL/L (ref 5–15)
AST SERPL-CCNC: 15 U/L (ref 1–32)
BASOPHILS # BLD AUTO: 0.05 10*3/MM3 (ref 0–0.2)
BASOPHILS NFR BLD AUTO: 0.9 % (ref 0–1.5)
BILIRUB SERPL-MCNC: 0.5 MG/DL (ref 0–1.2)
BUN SERPL-MCNC: 14 MG/DL (ref 6–20)
BUN/CREAT SERPL: 17.3 (ref 7–25)
CALCIUM SPEC-SCNC: 9.4 MG/DL (ref 8.6–10.5)
CHLORIDE SERPL-SCNC: 103 MMOL/L (ref 98–107)
CHOLEST SERPL-MCNC: 189 MG/DL (ref 0–200)
CO2 SERPL-SCNC: 26.6 MMOL/L (ref 22–29)
CREAT SERPL-MCNC: 0.81 MG/DL (ref 0.57–1)
DEPRECATED RDW RBC AUTO: 38.6 FL (ref 37–54)
EOSINOPHIL # BLD AUTO: 0.01 10*3/MM3 (ref 0–0.4)
EOSINOPHIL NFR BLD AUTO: 0.2 % (ref 0.3–6.2)
ERYTHROCYTE [DISTWIDTH] IN BLOOD BY AUTOMATED COUNT: 11.9 % (ref 12.3–15.4)
ERYTHROCYTE [SEDIMENTATION RATE] IN BLOOD: 5 MM/HR (ref 0–20)
FSH SERPL-ACNC: 6.25 MIU/ML
GFR SERPL CREATININE-BSD FRML MDRD: 76 ML/MIN/1.73
GLOBULIN UR ELPH-MCNC: 1.8 GM/DL
GLUCOSE SERPL-MCNC: 80 MG/DL (ref 65–99)
HCT VFR BLD AUTO: 40.1 % (ref 34–46.6)
HDLC SERPL-MCNC: 67 MG/DL (ref 40–60)
HGB BLD-MCNC: 13.4 G/DL (ref 12–15.9)
IMM GRANULOCYTES # BLD AUTO: 0.01 10*3/MM3 (ref 0–0.05)
IMM GRANULOCYTES NFR BLD AUTO: 0.2 % (ref 0–0.5)
LDLC SERPL CALC-MCNC: 111 MG/DL (ref 0–100)
LDLC/HDLC SERPL: 1.65 {RATIO}
LYMPHOCYTES # BLD AUTO: 1.51 10*3/MM3 (ref 0.7–3.1)
LYMPHOCYTES NFR BLD AUTO: 27.2 % (ref 19.6–45.3)
MCH RBC QN AUTO: 29.9 PG (ref 26.6–33)
MCHC RBC AUTO-ENTMCNC: 33.4 G/DL (ref 31.5–35.7)
MCV RBC AUTO: 89.5 FL (ref 79–97)
MONOCYTES # BLD AUTO: 0.46 10*3/MM3 (ref 0.1–0.9)
MONOCYTES NFR BLD AUTO: 8.3 % (ref 5–12)
NEUTROPHILS NFR BLD AUTO: 3.52 10*3/MM3 (ref 1.7–7)
NEUTROPHILS NFR BLD AUTO: 63.2 % (ref 42.7–76)
NRBC BLD AUTO-RTO: 0 /100 WBC (ref 0–0.2)
PLATELET # BLD AUTO: 269 10*3/MM3 (ref 140–450)
PMV BLD AUTO: 11.8 FL (ref 6–12)
POTASSIUM SERPL-SCNC: 4.4 MMOL/L (ref 3.5–5.2)
PROT SERPL-MCNC: 6.3 G/DL (ref 6–8.5)
RBC # BLD AUTO: 4.48 10*6/MM3 (ref 3.77–5.28)
SODIUM SERPL-SCNC: 138 MMOL/L (ref 136–145)
T3FREE SERPL-MCNC: 2.46 PG/ML (ref 2–4.4)
T4 FREE SERPL-MCNC: 1.13 NG/DL (ref 0.93–1.7)
TRIGL SERPL-MCNC: 58 MG/DL (ref 0–150)
TSH SERPL DL<=0.05 MIU/L-ACNC: 0.93 UIU/ML (ref 0.27–4.2)
VLDLC SERPL-MCNC: 11 MG/DL (ref 5–40)
WBC NRBC COR # BLD: 5.56 10*3/MM3 (ref 3.4–10.8)

## 2022-02-16 PROCEDURE — 83001 ASSAY OF GONADOTROPIN (FSH): CPT

## 2022-02-16 PROCEDURE — 85652 RBC SED RATE AUTOMATED: CPT

## 2022-02-16 PROCEDURE — 82607 VITAMIN B-12: CPT

## 2022-02-16 PROCEDURE — 80050 GENERAL HEALTH PANEL: CPT

## 2022-02-16 PROCEDURE — 80061 LIPID PANEL: CPT

## 2022-02-16 PROCEDURE — 84481 FREE ASSAY (FT-3): CPT

## 2022-02-16 PROCEDURE — 82306 VITAMIN D 25 HYDROXY: CPT

## 2022-02-16 PROCEDURE — 99213 OFFICE O/P EST LOW 20 MIN: CPT | Performed by: NURSE PRACTITIONER

## 2022-02-16 PROCEDURE — 84439 ASSAY OF FREE THYROXINE: CPT

## 2022-02-16 NOTE — PROGRESS NOTES
"Chief Complaint  Yasmine Berry is a 46 y.o.  female presenting for Follow-up (3 month breast check)    History of Present Illness  Here for 3 mo FU of clinical breast exam.  She has long hx of breast density and last mammo was [facility] ([facility] in 2021).  Last imaging was normal (R Breast US 2021).  She still feels a dense area in the R UOQ, but feels that it is smaller than in mid-November.  Does drink considerable amt coffee, but no other caffeine.  No FamHx of BrCa.      The following portions of the patient's history were reviewed and updated as appropriate: allergies, current medications, past family history, past medical history, past social history, past surgical history and problem list.    No Known Allergies      Current Outpatient Medications:   •  Diclofenac Sodium (VOLTAREN) 1 % gel gel, Apply 4 g topically to the appropriate area as directed 3 (Three) Times a Day., Disp: 50 g, Rfl: 1  •  hydrOXYzine (ATARAX) 25 MG tablet, Take 1 tablet by mouth 3 (Three) Times a Day As Needed for Itching or Anxiety., Disp: 30 tablet, Rfl: 0  •  ibuprofen (ADVIL,MOTRIN) 200 MG tablet, Take 200 mg by mouth Every 6 (Six) Hours As Needed for Mild Pain ., Disp: , Rfl:     Past Medical History:   Diagnosis Date   • Hypertension    • Plantar plate injury, right, initial encounter    • Vitamin D deficiency         Past Surgical History:   Procedure Laterality Date   • ECHO - CONVERTED  2021    EF 70%. Trace-Mild MR. RVSP- 27 mmHg   • PLANTAR FASCIA SURGERY Right        Objective  /78   Ht 162.6 cm (64\")   Wt 59.2 kg (130 lb 8.2 oz)   LMP 2022 (Exact Date)   Breastfeeding No   BMI 22.40 kg/m²     Physical Exam  Chest:   Breasts:      Right: No inverted nipple, mass, nipple discharge, skin change, axillary adenopathy or supraclavicular adenopathy.      Left: No inverted nipple, mass, nipple discharge, skin change, axillary adenopathy or supraclavicular adenopathy.        Comments: I cannot " appreciate any mass-type density in either breast, but the UOQ bilat with much FBD.   Lymphadenopathy:      Upper Body:      Right upper body: No supraclavicular or axillary adenopathy.      Left upper body: No supraclavicular or axillary adenopathy.         Assessment/Plan   Diagnoses and all orders for this visit:    1. Follow up (Primary)    2. Fibrocystic breast disease (FCBD), unspecified laterality    Cont Reg monthly SBE, will plan for FROY again this Fall (due to extreme density).  Enc'd to decrease caffeine.    Procedures        Return for Annual physical, Please sign NOEL & get records from ..    Yani Ruano, APRN  02/16/2022

## 2022-02-17 LAB
25(OH)D3 SERPL-MCNC: 36.7 NG/ML (ref 30–100)
VIT B12 BLD-MCNC: 152 PG/ML (ref 211–946)

## 2022-03-17 ENCOUNTER — OFFICE VISIT (OUTPATIENT)
Dept: INTERNAL MEDICINE | Facility: CLINIC | Age: 47
End: 2022-03-17

## 2022-03-17 VITALS
SYSTOLIC BLOOD PRESSURE: 118 MMHG | WEIGHT: 133 LBS | DIASTOLIC BLOOD PRESSURE: 80 MMHG | TEMPERATURE: 99.3 F | HEART RATE: 79 BPM | OXYGEN SATURATION: 98 % | HEIGHT: 64 IN | BODY MASS INDEX: 22.71 KG/M2

## 2022-03-17 DIAGNOSIS — E78.5 DYSLIPIDEMIA: ICD-10-CM

## 2022-03-17 DIAGNOSIS — M25.521 RIGHT ELBOW PAIN: ICD-10-CM

## 2022-03-17 DIAGNOSIS — D51.8 OTHER VITAMIN B12 DEFICIENCY ANEMIA: ICD-10-CM

## 2022-03-17 DIAGNOSIS — F51.01 PRIMARY INSOMNIA: ICD-10-CM

## 2022-03-17 DIAGNOSIS — Z00.00 ROUTINE MEDICAL EXAM: Primary | ICD-10-CM

## 2022-03-17 PROCEDURE — 99396 PREV VISIT EST AGE 40-64: CPT | Performed by: PHYSICIAN ASSISTANT

## 2022-03-17 PROCEDURE — 96372 THER/PROPH/DIAG INJ SC/IM: CPT | Performed by: PHYSICIAN ASSISTANT

## 2022-03-17 RX ORDER — BUSPIRONE HYDROCHLORIDE 5 MG/1
5 TABLET ORAL NIGHTLY PRN
Qty: 30 TABLET | Refills: 3 | Status: SHIPPED | OUTPATIENT
Start: 2022-03-17 | End: 2023-02-16

## 2022-03-17 RX ORDER — CYANOCOBALAMIN 1000 UG/ML
1000 INJECTION, SOLUTION INTRAMUSCULAR; SUBCUTANEOUS
Status: DISCONTINUED | OUTPATIENT
Start: 2022-03-17 | End: 2022-09-27

## 2022-03-17 NOTE — PROGRESS NOTES
[facility] Internal Medicine    Yasmine Berry  1975   [id number]      Patient Care Team:  La Charles PA-C as PCP - General (Physician Assistant)  Yani Ruano APRN as Nurse Practitioner (Obstetrics and Gynecology)    Chief Complaint::   Chief Complaint   Patient presents with   • Annual Exam   • Elbow Pain   • Fatigue        HPI  Yasmine Berry is A 46-year-old female with a date of birth of 1975, with a history of insomnia and dizziness who presents to the clinic for a routine physical.    Dizziness  In 2021, she acknowledges having an EKG for complaints of dizziness; however, since she lost weight that has improved. She reports she does not have them as often as she was previously. She notes she was informed her EKG and echocardiogram were well; however, her EKG resulted with an incomplete right bundle branch in 2021. Her most recent EKG stated she was in normal sinus rhythm with sinus arrhythmia. Her echocardiogram, from 2021, resulted in mitral valve regurgitation.    Encounter for labs  The patient acknowledges her most recent labs did show she was not in menopause. She notes her LDL was 111 mg/dl. Her B12 level is low at 152 pg/ml, which she feels that this explains why her energy level did not increase when she began her diet and exercise. Additionally, her vitamin D level is 36 ng/ml. She inquires if her B12 level may be the cause of her numbness and adds the numbness is now pain.    Pain  She states the numbness that has turned to pain begins in her right elbow and radiates down her arm to her wrist. She states was experiencing numbness at her last appointment and shortly after leaving the pain began. She denies feeling pain during push-ups, but during other motions and opening things. She reports at times it feels hot. She notes it wakes her in the morning and notes she has pain while moving her covers. She denies performing any activities to worsen this condition. She reports  "she found exercises on YouTube and had ordered a sleeve; however, it was too tight.    Insomnia  She reports she has tried hydroxyzine; however, it makes her feel \"as if she is underwater.\" She denies trying a 0.5 tablet. She reports that melatonin does not stop her brain enough for her to sleep. She denies trying BuSpar; however, she believes she has anxiety when she lays down. She denies a difficulty with going to sleep; however, it is not a restful sleep.    The following portions of the patient's history were reviewed and updated as appropriate: active problem list, medication list, allergies, family history, social history          Patient Active Problem List   Diagnosis   • Dyslipidemia   • Dizziness   • Scoliosis   • Primary insomnia   • Irregular menses   • Right elbow pain   • Lipid screening   • Encounter for vitamin deficiency screening   • Anemia due to vitamin B12 deficiency   • Routine medical exam        Past Medical History:   Diagnosis Date   • Hypertension    • Plantar plate injury, right, initial encounter    • Vitamin D deficiency        Past Surgical History:   Procedure Laterality Date   • ECHO - CONVERTED  02/24/2021    EF 70%. Trace-Mild MR. RVSP- 27 mmHg   • PLANTAR FASCIA SURGERY Right        Family History   Problem Relation Age of Onset   • Hypertension Mother    • Hyperlipidemia Mother    • Transient ischemic attack Mother 60   • Hypertension Father    • Heart disease Father 50        stent placement   • No Known Problems Sister    • Asthma Brother    • Hypertension Maternal Grandmother    • Diabetes Paternal Grandmother    • Hypertension Paternal Grandmother    • Hyperlipidemia Paternal Grandmother    • Heart attack Paternal Grandfather    • No Known Problems Son    • No Known Problems Daughter        Social History     Socioeconomic History   • Marital status:    Tobacco Use   • Smoking status: Never Smoker   • Smokeless tobacco: Never Used   Vaping Use   • Vaping Use: Never " "used   Substance and Sexual Activity   • Alcohol use: Yes     Alcohol/week: 2.0 standard drinks     Types: 2 Glasses of wine per week   • Drug use: Never   • Sexual activity: Yes     Partners: Male     Birth control/protection: Surgical     Comment: vasectomy       No Known Allergies    Review of Systems   Constitutional: Negative for activity change, appetite change, diaphoresis, fatigue, unexpected weight gain and unexpected weight loss.   HENT: Negative for hearing loss.    Eyes: Negative for visual disturbance.   Respiratory: Negative for chest tightness and shortness of breath.    Cardiovascular: Negative for chest pain, palpitations and leg swelling.   Gastrointestinal: Negative for abdominal pain, blood in stool, GERD and indigestion.   Endocrine: Negative for cold intolerance and heat intolerance.   Genitourinary: Negative for dysuria and hematuria.   Musculoskeletal: Positive for arthralgias. Negative for myalgias.   Skin: Negative for skin lesions.   Neurological: Negative for tremors, seizures, syncope, speech difficulty, weakness, headache, memory problem and confusion.   Hematological: Does not bruise/bleed easily.   Psychiatric/Behavioral: Negative for sleep disturbance and depressed mood. The patient is not nervous/anxious.         Vital Signs  Vitals:    03/17/22 1608   BP: 118/80   BP Location: Left arm   Patient Position: Sitting   Cuff Size: Adult   Pulse: 79   Temp: 99.3 °F (37.4 °C)   TempSrc: Temporal   SpO2: 98%   Weight: 60.3 kg (133 lb)   Height: 162.6 cm (64.02\")   PainSc:   5   PainLoc: Elbow     Body mass index is 22.82 kg/m².  Patient's Body mass index is 22.82 kg/m². indicating that she is within normal range (BMI 18.5-24.9). No BMI management plan needed..         Current Outpatient Medications:   •  Diclofenac Sodium (VOLTAREN) 1 % gel gel, Apply 4 g topically to the appropriate area as directed 3 (Three) Times a Day., Disp: 50 g, Rfl: 1  •  ibuprofen (ADVIL,MOTRIN) 200 MG tablet, " Take 200 mg by mouth Every 6 (Six) Hours As Needed for Mild Pain ., Disp: , Rfl:   •  busPIRone (BUSPAR) 5 MG tablet, Take 1 tablet by mouth At Night As Needed (anxiety)., Disp: 30 tablet, Rfl: 3    Current Facility-Administered Medications:   •  cyanocobalamin injection 1,000 mcg, 1,000 mcg, Intramuscular, Q28 Days, La Charles PA-C, 1,000 mcg at 03/18/22 1503    Physical Exam  Vitals and nursing note reviewed.   Constitutional:       General: She is not in acute distress.     Appearance: Normal appearance. She is well-developed. She is not ill-appearing, toxic-appearing or diaphoretic.   HENT:      Head: Normocephalic and atraumatic.      Right Ear: Hearing, tympanic membrane, ear canal and external ear normal.      Left Ear: Hearing, tympanic membrane, ear canal and external ear normal.      Nose: Nose normal.      Mouth/Throat:      Mouth: Mucous membranes are moist.      Pharynx: Oropharynx is clear. Uvula midline. No posterior oropharyngeal erythema.   Eyes:      General: Lids are normal.      Conjunctiva/sclera: Conjunctivae normal.      Pupils: Pupils are equal, round, and reactive to light.   Neck:      Thyroid: No thyromegaly.      Vascular: No carotid bruit or JVD.   Cardiovascular:      Rate and Rhythm: Normal rate and regular rhythm.      Pulses: Normal pulses.      Heart sounds: Normal heart sounds. No murmur heard.  Pulmonary:      Effort: Pulmonary effort is normal. No respiratory distress.      Breath sounds: Normal breath sounds.   Abdominal:      General: Bowel sounds are normal.      Palpations: Abdomen is soft. There is no mass.      Tenderness: There is no abdominal tenderness.   Musculoskeletal:         General: Tenderness present. No swelling. Normal range of motion.      Right elbow: Tenderness present in lateral epicondyle.      Cervical back: Full passive range of motion without pain, normal range of motion and neck supple.      Comments: Lateral epicondylitis noted    Lymphadenopathy:      Cervical: No cervical adenopathy.   Skin:     General: Skin is warm and dry.      Findings: No lesion or rash.   Neurological:      General: No focal deficit present.      Mental Status: She is alert and oriented to person, place, and time. Mental status is at baseline.      Cranial Nerves: No cranial nerve deficit.      Sensory: No sensory deficit.      Motor: No weakness.      Coordination: Coordination normal.      Gait: Gait normal.      Deep Tendon Reflexes: Reflexes are normal and symmetric.   Psychiatric:         Mood and Affect: Mood normal.         Speech: Speech normal.         Behavior: Behavior normal.         Thought Content: Thought content normal.         Judgment: Judgment normal.          ACE III MINI            Results Review:    No results found for this or any previous visit (from the past 672 hour(s)).    ECG 12 Lead    Date/Time: 3/18/2022 8:45 AM  Performed by: La Charles PA-C  Authorized by: La Charles PA-C   Comparison: not compared with previous ECG   Rhythm: sinus rhythm and sinus arrhythmia  BPM: 65    Clinical impression: normal ECG  Comments: Sinus rhythm with sinus arrhythmia            Medication Review: Medications reviewed and noted    Social History     Socioeconomic History   • Marital status:    Tobacco Use   • Smoking status: Never Smoker   • Smokeless tobacco: Never Used   Vaping Use   • Vaping Use: Never used   Substance and Sexual Activity   • Alcohol use: Yes     Alcohol/week: 2.0 standard drinks     Types: 2 Glasses of wine per week   • Drug use: Never   • Sexual activity: Yes     Partners: Male     Birth control/protection: Surgical     Comment: vasectomy        Assessment/Plan:    Problem List Items Addressed This Visit        Cardiac and Vasculature    Dyslipidemia    Overview     Slightly elevated LDL cholesterol.  Continue regular exercise routine.  Continue low fat, low cholesterol diet.              Health  Encounters    Routine medical exam - Primary    Overview     She is fully vaccinated to COVID.  Discussed labs, questions answered.    She is current on age related screenings.  Continue regular cardiovascular exercise routine.  Continue low fat, low cholesterol diet.  Order for PT lateral epicondylitis placed.           Relevant Orders    ECG 12 Lead       Hematology and Neoplasia    Anemia due to vitamin B12 deficiency    Relevant Medications    cyanocobalamin injection 1,000 mcg       Musculoskeletal and Injuries    Right elbow pain    Overview      Reviewed exercises that she performs at home, such as push-ups or other offending exercises.  Patient reports no pain when performing these regular exercises. Referral to PT placed.           Relevant Orders    Ambulatory Referral to Physical Therapy Evaluate and treat (Completed)           There are no Patient Instructions on file for this visit.     Plan of care reviewed with patient at the conclusion of today's visit. Education was provided regarding diagnosis, management, and any prescribed or recommended OTC medications.Patient verbalizes understanding of and agreement with management plan.      Transcribed from ambient dictation for La Charles PA-C by Mery Cody.  03/18/22   10:33 EDT    Patient verbalized consent to the visit recording.        I spent 26 minutes caring for Yasmine on this date of service. This time includes time spent by me in the following activities:preparing for the visit, reviewing tests, obtaining and/or reviewing a separately obtained history, performing a medically appropriate examination and/or evaluation , counseling and educating the patient/family/caregiver, ordering medications, tests, or procedures and referring and communicating with other health care professionals     La Charles PA-C      Note: Part of this note may be an electronic transcription/translation of spoken language to printed text using the  Dragon Dictation system.

## 2022-03-17 NOTE — PROGRESS NOTES
"QUICK REFERENCE INFORMATION:  The ABCs of the Annual Wellness Visit    Annual Wellness visit    HEALTH RISK ASSESSMENT    1975    Recent History  {Hospitalization history:[id number]::\"No hospitalization(s) within the last year.\"}.        Current Medical Providers:  Patient Care Team:  La Charles PA-C as PCP - General (Physician Assistant)  Yani Ruano APRN as Nurse Practitioner (Obstetrics and Gynecology)        Smoking Status:  Social History     Tobacco Use   Smoking Status Never Smoker   Smokeless Tobacco Never Used       Alcohol Consumption:  Social History     Substance and Sexual Activity   Alcohol Use Yes   • Alcohol/week: 2.0 standard drinks   • Types: 2 Glasses of wine per week       Depression Screen:   No flowsheet data found.    Health Habits:              Recent Lab Results:  CMP:  Lab Results   Component Value Date    BUN 14 02/16/2022    CREATININE 0.81 02/16/2022    EGFRIFNONA 76 02/16/2022    BCR 17.3 02/16/2022     02/16/2022    K 4.4 02/16/2022    CO2 26.6 02/16/2022    CALCIUM 9.4 02/16/2022    ALBUMIN 4.50 02/16/2022    BILITOT 0.5 02/16/2022    ALKPHOS 55 02/16/2022    AST 15 02/16/2022    ALT 14 02/16/2022     Lipid Panel:  Lab Results   Component Value Date    CHOL 189 02/16/2022    TRIG 58 02/16/2022    HDL 67 (H) 02/16/2022    VLDL 11 02/16/2022    LDLHDL 1.65 02/16/2022     HbA1c:           Age-appropriate Screening Schedule:  Refer to the list below for future screening recommendations based on patient's age, sex and/or medical conditions. Orders for these recommended tests are listed in the plan section. The patient has been provided with a written plan.    Health Maintenance   Topic Date Due   • TDAP/TD VACCINES (1 - Tdap) Never done   • PAP SMEAR  09/12/2022   • LIPID PANEL  02/16/2023   • INFLUENZA VACCINE  Completed        Subjective   History of Present Illness    Yasmine Berry is a 46 y.o. female who presents for an Annual Wellness Visit.    The " "following portions of the patient's history were reviewed and updated as appropriate: {history reviewed:20406::\"allergies\",\"current medications\",\"past family history\",\"past medical history\",\"past social history\",\"past surgical history\",\"problem list\"}.    Outpatient Medications Prior to Visit   Medication Sig Dispense Refill   • Diclofenac Sodium (VOLTAREN) 1 % gel gel Apply 4 g topically to the appropriate area as directed 3 (Three) Times a Day. 50 g 1   • hydrOXYzine (ATARAX) 25 MG tablet Take 1 tablet by mouth 3 (Three) Times a Day As Needed for Itching or Anxiety. 30 tablet 0   • ibuprofen (ADVIL,MOTRIN) 200 MG tablet Take 200 mg by mouth Every 6 (Six) Hours As Needed for Mild Pain .       No facility-administered medications prior to visit.       Patient Active Problem List   Diagnosis   • Dyslipidemia   • Dizziness   • Scoliosis   • Primary insomnia   • Irregular menses   • Paresthesia and pain of both upper extremities   • Lipid screening   • Encounter for vitamin deficiency screening       Advance Care Planning:  {Advance Directive Status:54586}    Identification of Risk Factors:  Risk factors include: {; WELLNESS RISK FACTORS:67413}.    Review of Systems    Compared to one year ago, the patient feels her physical health is {better worse same:69583}.  Compared to one year ago, the patient feels her mental health is {better worse same:81619}.    Objective     Physical Exam    There were no vitals filed for this visit.    Patient's There is no height or weight on file to calculate BMI. indicating that she is {weight categories:46291}.      CMP:  Lab Results   Component Value Date    BUN 14 02/16/2022    CREATININE 0.81 02/16/2022    EGFRIFNONA 76 02/16/2022    BCR 17.3 02/16/2022     02/16/2022    K 4.4 02/16/2022    CO2 26.6 02/16/2022    CALCIUM 9.4 02/16/2022    ALBUMIN 4.50 02/16/2022    BILITOT 0.5 02/16/2022    ALKPHOS 55 02/16/2022    AST 15 02/16/2022    ALT 14 02/16/2022     HbA1c:  No results " found for: HGBA1C  Microalbumin:  No results found for: MICROALBUR, POCMALB, POCCREAT  Lipid Panel  Lab Results   Component Value Date    CHOL 189 02/16/2022    TRIG 58 02/16/2022    HDL 67 (H) 02/16/2022     (H) 02/16/2022    AST 15 02/16/2022    ALT 14 02/16/2022       Assessment/Plan   Patient Self-Management and Personalized Health Advice  The patient has been provided with information about: {; PERSONALIZED HEALTH ADVICE:76328} and preventive services including:   · {plan:95702}.    Problem List Items Addressed This Visit    None          There are no Patient Instructions on file for this visit.    Outpatient Encounter Medications as of 3/17/2022   Medication Sig Dispense Refill   • Diclofenac Sodium (VOLTAREN) 1 % gel gel Apply 4 g topically to the appropriate area as directed 3 (Three) Times a Day. 50 g 1   • hydrOXYzine (ATARAX) 25 MG tablet Take 1 tablet by mouth 3 (Three) Times a Day As Needed for Itching or Anxiety. 30 tablet 0   • ibuprofen (ADVIL,MOTRIN) 200 MG tablet Take 200 mg by mouth Every 6 (Six) Hours As Needed for Mild Pain .       No facility-administered encounter medications on file as of 3/17/2022.       Reviewed use of high risk medication in the elderly: {Response; yes/no/na:63}  Reviewed for potential of harmful drug interactions in the elderly: {Response; yes/no/na:63}    Follow Up:  No follow-ups on file.     An After Visit Summary and PPPS with all of these plans were given to the patient.           {Time Spent (Optional):20589}    Amparo Maldonado MA    Note: Part of this note may be an electronic transcription/translation of spoken language to printed text using the Dragon Dictation System.

## 2022-03-18 PROCEDURE — 93000 ELECTROCARDIOGRAM COMPLETE: CPT | Performed by: PHYSICIAN ASSISTANT

## 2022-03-18 RX ADMIN — CYANOCOBALAMIN 1000 MCG: 1000 INJECTION, SOLUTION INTRAMUSCULAR; SUBCUTANEOUS at 15:03

## 2022-03-19 DIAGNOSIS — D51.8 OTHER VITAMIN B12 DEFICIENCY ANEMIA: Primary | ICD-10-CM

## 2022-03-19 PROBLEM — D64.9 ABSOLUTE ANEMIA: Status: ACTIVE | Noted: 2022-03-19

## 2022-03-19 PROBLEM — D51.9 ANEMIA DUE TO VITAMIN B12 DEFICIENCY: Status: ACTIVE | Noted: 2022-03-19

## 2022-03-19 PROBLEM — Z00.00 ROUTINE MEDICAL EXAM: Status: ACTIVE | Noted: 2022-03-19

## 2022-03-19 PROBLEM — M25.521 RIGHT ELBOW PAIN: Status: ACTIVE | Noted: 2022-02-07

## 2022-03-21 RX ORDER — SYRINGE W-NEEDLE,DISPOSAB,3 ML 25GX5/8"
SYRINGE, EMPTY DISPOSABLE MISCELLANEOUS
Qty: 10 EACH | Refills: 0 | Status: SHIPPED | OUTPATIENT
Start: 2022-03-21

## 2022-03-25 ENCOUNTER — TELEPHONE (OUTPATIENT)
Dept: INTERNAL MEDICINE | Facility: CLINIC | Age: 47
End: 2022-03-25

## 2022-03-25 NOTE — TELEPHONE ENCOUNTER
Called patient she stated that she had scheduled an appt at [facility] but they called her today and they don't have the order. Jammie talked to patient

## 2022-03-25 NOTE — TELEPHONE ENCOUNTER
Caller: Yasmine Berry    Relationship: Self    Best call back number: [phone]    What was the call regarding:   PATIENT WOULD LIKE A CALL BACK REGARDING THE STATUS OF HER REFERRAL TO PHYSICAL THERAPY     Do you require a callback: YES

## 2022-05-06 ENCOUNTER — TELEMEDICINE (OUTPATIENT)
Dept: INTERNAL MEDICINE | Facility: CLINIC | Age: 47
End: 2022-05-06

## 2022-05-06 DIAGNOSIS — S99.911A INJURY OF RIGHT ANKLE, INITIAL ENCOUNTER: ICD-10-CM

## 2022-05-06 DIAGNOSIS — J06.9 VIRAL UPPER RESPIRATORY TRACT INFECTION: Primary | ICD-10-CM

## 2022-05-06 PROCEDURE — 99213 OFFICE O/P EST LOW 20 MIN: CPT | Performed by: PHYSICIAN ASSISTANT

## 2022-05-06 RX ORDER — AZITHROMYCIN 250 MG/1
TABLET, FILM COATED ORAL
Qty: 6 TABLET | Refills: 0 | Status: SHIPPED | OUTPATIENT
Start: 2022-05-06 | End: 2022-09-21

## 2022-05-06 RX ORDER — BENZONATATE 100 MG/1
100 CAPSULE ORAL NIGHTLY PRN
Qty: 30 CAPSULE | Refills: 1 | Status: SHIPPED | OUTPATIENT
Start: 2022-05-06 | End: 2022-09-21

## 2022-05-06 NOTE — PROGRESS NOTES
[facility] Internal Medicine    Yasmine Berry  1975   [id number]      Patient Care Team:  La Charles PA-C as PCP - General (Physician Assistant)  Yani Ruano APRN as Nurse Practitioner (Obstetrics and Gynecology)    Chief Complaint::   Chief Complaint   Patient presents with   • Covid-19 Home Monitoring Video Visit   • Cough      You have chosen to receive care through a video visit. Do you consent to use a video visit for your medical care today? Yes    HPI  Yasmine is a 46 year old female who presents to discuss recent positive COVID test on Monday.  Initial symptoms included sore throat and headache.  She reports gradually developing rawness and congestion in her chest.  Now having continued nighttime cough.  Initially having fever and body aches, but this is resolved.  Has been using cough drops without any relief.  Tuesday, she fell and sprained her right ankle. She got up suddenly, twisted her ankle and has history of injury to the right ankle.  He has been trying to elevate, ice, and using compression to her ankle.  This has helped some.She has been using crutches, but able to walk on it today.    She has an orthopedic surgeon who can see her on Monday in [place].      Patient Active Problem List   Diagnosis   • Dyslipidemia   • Dizziness   • Scoliosis   • Primary insomnia   • Irregular menses   • Right elbow pain   • Lipid screening   • Encounter for vitamin deficiency screening   • Anemia due to vitamin B12 deficiency   • Routine medical exam   • Right ankle injury   • Lower respiratory tract infection due to COVID-19 virus   • Viral upper respiratory tract infection        Past Medical History:   Diagnosis Date   • Hypertension    • Plantar plate injury, right, initial encounter    • Vitamin D deficiency        Past Surgical History:   Procedure Laterality Date   • ECHO - CONVERTED  02/24/2021    EF 70%. Trace-Mild MR. RVSP- 27 mmHg   • PLANTAR FASCIA SURGERY Right        Family  History   Problem Relation Age of Onset   • Hypertension Mother    • Hyperlipidemia Mother    • Transient ischemic attack Mother 60   • Hypertension Father    • Heart disease Father 50        stent placement   • No Known Problems Sister    • Asthma Brother    • Hypertension Maternal Grandmother    • Diabetes Paternal Grandmother    • Hypertension Paternal Grandmother    • Hyperlipidemia Paternal Grandmother    • Heart attack Paternal Grandfather    • No Known Problems Son    • No Known Problems Daughter        Social History     Socioeconomic History   • Marital status:    Tobacco Use   • Smoking status: Never Smoker   • Smokeless tobacco: Never Used   Vaping Use   • Vaping Use: Never used   Substance and Sexual Activity   • Alcohol use: Yes     Alcohol/week: 2.0 standard drinks     Types: 2 Glasses of wine per week   • Drug use: Never   • Sexual activity: Yes     Partners: Male     Birth control/protection: Surgical     Comment: vasectomy       No Known Allergies    Review of Systems   Constitutional: Negative for activity change, appetite change, diaphoresis, fatigue, unexpected weight gain and unexpected weight loss.   HENT: Positive for congestion. Negative for hearing loss.    Eyes: Negative for visual disturbance.   Respiratory: Positive for cough. Negative for chest tightness and shortness of breath.    Cardiovascular: Negative for chest pain, palpitations and leg swelling.   Gastrointestinal: Negative for abdominal pain, blood in stool, GERD and indigestion.   Endocrine: Negative for cold intolerance and heat intolerance.   Genitourinary: Negative for dysuria and hematuria.   Musculoskeletal: Positive for arthralgias and gait problem. Negative for myalgias.   Skin: Negative for skin lesions.   Neurological: Negative for tremors, seizures, syncope, speech difficulty, weakness, headache, memory problem and confusion.   Hematological: Does not bruise/bleed easily.   Psychiatric/Behavioral: Negative for  "sleep disturbance and depressed mood. The patient is not nervous/anxious.         Vital Signs  There were no vitals filed for this visit.  There is no height or weight on file to calculate BMI.  BMI is within normal parameters. No follow-up required.       Current Outpatient Medications:   •  azithromycin (Zithromax Z-Tutu) 250 MG tablet, Take 2 tablets by mouth on day 1, then 1 tablet daily on days 2-5, Disp: 6 tablet, Rfl: 0  •  benzonatate (Tessalon Perles) 100 MG capsule, Take 1 capsule by mouth At Night As Needed for Cough., Disp: 30 capsule, Rfl: 1  •  busPIRone (BUSPAR) 5 MG tablet, Take 1 tablet by mouth At Night As Needed (anxiety)., Disp: 30 tablet, Rfl: 3  •  Cyanocobalamin 1000 MCG/ML kit, Inject 1 mL into the appropriate muscle as directed by prescriber 1 (One) Time Per Week., Disp: 1 kit, Rfl: 0  •  Diclofenac Sodium (VOLTAREN) 1 % gel gel, Apply 4 g topically to the appropriate area as directed 3 (Three) Times a Day., Disp: 50 g, Rfl: 1  •  ibuprofen (ADVIL,MOTRIN) 200 MG tablet, Take 200 mg by mouth Every 6 (Six) Hours As Needed for Mild Pain ., Disp: , Rfl:   •  Syringe 25G X 1\" 3 ML misc, Use to inject vitamin b12 injection, Disp: 10 each, Rfl: 0    Current Facility-Administered Medications:   •  cyanocobalamin injection 1,000 mcg, 1,000 mcg, Intramuscular, Q28 Days, La Charles PA-C, 1,000 mcg at 03/18/22 1503    Physical Exam  Constitutional:       Appearance: Normal appearance.   HENT:      Head: Normocephalic and atraumatic.      Nose: Nose normal.   Pulmonary:      Effort: No respiratory distress.   Skin:     Coloration: Skin is not jaundiced or pale.   Neurological:      Mental Status: She is alert and oriented to person, place, and time. Mental status is at baseline.   Psychiatric:         Mood and Affect: Mood normal.         Behavior: Behavior normal.         Thought Content: Thought content normal.         Judgment: Judgment normal.          ACE III MINI            Results " Review:    No results found for this or any previous visit (from the past 672 hour(s)).  Procedures    Medication Review: Medications reviewed and noted    Social History     Socioeconomic History   • Marital status:    Tobacco Use   • Smoking status: Never Smoker   • Smokeless tobacco: Never Used   Vaping Use   • Vaping Use: Never used   Substance and Sexual Activity   • Alcohol use: Yes     Alcohol/week: 2.0 standard drinks     Types: 2 Glasses of wine per week   • Drug use: Never   • Sexual activity: Yes     Partners: Male     Birth control/protection: Surgical     Comment: vasectomy        Assessment/Plan:    Problem List Items Addressed This Visit        ENT    Viral upper respiratory tract infection - Primary    Relevant Medications    azithromycin (Zithromax Z-Tutu) 250 MG tablet    benzonatate (Tessalon Perles) 100 MG capsule       Musculoskeletal and Injuries    Right ankle injury    Overview     Currently using ice, elevation, and compression with results.  Has been using crutches.  She does have an orthopedic surgeon appointment on Monday in [place].                This visit has been rescheduled as a video visit to comply with patient safety concerns in accordance with CDC recommendations. Total time of discussion was 15minutes.     Plan of care reviewed with patient at the conclusion of today's visit. Education was provided regarding diagnosis, management, and any prescribed or recommended OTC medications.Patient verbalizes understanding of and agreement with management plan.         I spent 15 minutes caring for Yasmine on this date of service. This time includes time spent by me in the following activities:preparing for the visit, reviewing tests, obtaining and/or reviewing a separately obtained history, performing a medically appropriate examination and/or evaluation , counseling and educating the patient/family/caregiver and ordering medications, tests, or procedures    La Charles,  DIEGO      Note: Part of this note may be an electronic transcription/translation of spoken language to printed text using the Dragon Dictation system.

## 2022-05-08 PROBLEM — S99.911A RIGHT ANKLE INJURY: Status: ACTIVE | Noted: 2022-05-08

## 2022-05-08 PROBLEM — J06.9 VIRAL UPPER RESPIRATORY TRACT INFECTION: Status: ACTIVE | Noted: 2022-05-08

## 2022-05-08 PROBLEM — U07.1 LOWER RESPIRATORY TRACT INFECTION DUE TO COVID-19 VIRUS: Status: ACTIVE | Noted: 2022-05-08

## 2022-05-08 PROBLEM — J22 LOWER RESPIRATORY TRACT INFECTION DUE TO COVID-19 VIRUS: Status: ACTIVE | Noted: 2022-05-08

## 2022-08-12 ENCOUNTER — TELEPHONE (OUTPATIENT)
Dept: OBSTETRICS AND GYNECOLOGY | Facility: CLINIC | Age: 47
End: 2022-08-12

## 2022-08-12 NOTE — TELEPHONE ENCOUNTER
Caller: Yasmine Berry    Relationship: Self    Best call back number: [phone]    What orders are you requesting (i.e. lab or imaging): MAMMO    In what timeframe would the patient need to come in: ASAP    Where will you receive your lab/imaging services: [facility] AT [address] [place] [address], [place], [place]     Additional notes:   PT IS REQUESTING MAMMO ORDERS BE SENT TO THE [facility] AT [address] [place] [address]. PT STATED THEY WILL NOT ACCEPT THE PAPER COPIED PREVIOUSLY MAILED TO PT AND WILL NOT SCHEDULE UNTIL THE ORDERS ARE RECEIVED.     PLEASE CALL PT ONCE ORDERS HAVE BEEN SENT.

## 2022-09-21 ENCOUNTER — OFFICE VISIT (OUTPATIENT)
Dept: OBSTETRICS AND GYNECOLOGY | Facility: CLINIC | Age: 47
End: 2022-09-21

## 2022-09-21 VITALS
SYSTOLIC BLOOD PRESSURE: 152 MMHG | BODY MASS INDEX: 22.3 KG/M2 | HEIGHT: 64 IN | WEIGHT: 130.6 LBS | DIASTOLIC BLOOD PRESSURE: 70 MMHG

## 2022-09-21 DIAGNOSIS — N60.11 FIBROCYSTIC DISEASE OF RIGHT BREAST: ICD-10-CM

## 2022-09-21 DIAGNOSIS — Z01.411 ENCOUNTER FOR GYNECOLOGICAL EXAMINATION WITH ABNORMAL FINDING: Primary | ICD-10-CM

## 2022-09-21 PROCEDURE — 99396 PREV VISIT EST AGE 40-64: CPT | Performed by: NURSE PRACTITIONER

## 2022-09-21 NOTE — PROGRESS NOTES
Chief Complaint  Yasmine Berry is a 47 y.o.  female presenting for Annual Exam    History of Present Illness  Yasmine is a very pleasant 48yo woman, , my pt previously at [facility].  She has regular monthly menses.  (They are more regular than ever in the past.  She is a runner.  And used to have some irreg cycles/ sometimes skipped periods.)  Menses not too heavy or prolonged.  Vas for contraception.  No vaginitis sx.  No dyspareunia.  Never any postcoital bleeding.      She had Mammogram, additional compression views, and a right breast ultrasound today.  (See the previous two visits here, due to a palpable & tender area in the right breast UOQ/axillary area.)  That area has gotten much LESS prominent since that time.  But, that was the area of question today.  After the additional views, it appeared to be much less suspicious, and she was given a recommendation for follow up in one year.  We discussed the reassuring nature of it being LESS prominent now than when she first noted it.  But, if she feels troubled about it, or wishes to get a 2nd opinion, I will be glad to refer for that.      The following portions of the patient's history were reviewed and updated as appropriate: allergies, current medications, past family history, past medical history, past social history, past surgical history and problem list.    No Known Allergies      Current Outpatient Medications:   •  azithromycin (Zithromax Z-Tutu) 250 MG tablet, Take 2 tablets by mouth on day 1, then 1 tablet daily on days 2-5, Disp: 6 tablet, Rfl: 0  •  benzonatate (Tessalon Perles) 100 MG capsule, Take 1 capsule by mouth At Night As Needed for Cough., Disp: 30 capsule, Rfl: 1  •  busPIRone (BUSPAR) 5 MG tablet, Take 1 tablet by mouth At Night As Needed (anxiety)., Disp: 30 tablet, Rfl: 3  •  Cyanocobalamin 1000 MCG/ML kit, Inject 1 mL into the appropriate muscle as directed by prescriber 1 (One) Time Per Week., Disp: 1 kit, Rfl: 0  •   "Diclofenac Sodium (VOLTAREN) 1 % gel gel, Apply 4 g topically to the appropriate area as directed 3 (Three) Times a Day., Disp: 50 g, Rfl: 1  •  ibuprofen (ADVIL,MOTRIN) 200 MG tablet, Take 200 mg by mouth Every 6 (Six) Hours As Needed for Mild Pain ., Disp: , Rfl:   •  Syringe 25G X 1\" 3 ML misc, Use to inject vitamin b12 injection, Disp: 10 each, Rfl: 0    Current Facility-Administered Medications:   •  cyanocobalamin injection 1,000 mcg, 1,000 mcg, Intramuscular, Q28 Days, La Charles PA-C, 1,000 mcg at 03/18/22 1503    Past Medical History:   Diagnosis Date   • Hypertension    • Plantar plate injury, right, initial encounter    • Vitamin D deficiency         Past Surgical History:   Procedure Laterality Date   • ECHO - CONVERTED  02/24/2021    EF 70%. Trace-Mild MR. RVSP- 27 mmHg   • PLANTAR FASCIA SURGERY Right        Objective  /70   Ht 162.6 cm (64\")   Wt 59.2 kg (130 lb 9.6 oz)   LMP 09/12/2022 (Exact Date)   Breastfeeding No   BMI 22.42 kg/m²     Physical Exam  Exam conducted with a chaperone present.   Constitutional:       Appearance: Normal appearance.   HENT:      Head: Normocephalic.   Neck:      Thyroid: No thyroid mass or thyromegaly.   Cardiovascular:      Rate and Rhythm: Normal rate and regular rhythm.      Heart sounds: Normal heart sounds.   Pulmonary:      Effort: Pulmonary effort is normal.      Breath sounds: Normal breath sounds.   Chest:   Breasts:      Right: No inverted nipple, mass, nipple discharge, axillary adenopathy or supraclavicular adenopathy.      Left: No inverted nipple, mass, nipple discharge, axillary adenopathy or supraclavicular adenopathy.        Comments: I was unable to locate the area of concern today, until Yasmine was able to locate it, and pointed it out to me.  I felt a small dense area, ill-defined, deep in the UOQ of right breast at the axillary border at ~10:00-11:00 position.  It is not well delineated to me, but feels <2cm in size.  "   Abdominal:      Palpations: Abdomen is soft. There is no mass.      Tenderness: There is no abdominal tenderness.   Genitourinary:     General: Normal vulva.      Labia:         Right: No lesion.         Left: No lesion.       Vagina: Normal. No erythema.      Cervix: No discharge, lesion or erythema.      Uterus: Not enlarged and not tender.       Adnexa:         Right: No mass or tenderness.          Left: No mass or tenderness.        Comments: Anus appears wnl.  No rectal exam performed.  Lymphadenopathy:      Upper Body:      Right upper body: No supraclavicular or axillary adenopathy.      Left upper body: No supraclavicular or axillary adenopathy.   Neurological:      Mental Status: She is alert.         Assessment/Plan   Diagnoses and all orders for this visit:    1. Encounter for gynecological examination with abnormal finding (Primary)  -     LIQUID-BASED PAP SMEAR, [facility] ([facility])    2. Fibrocystic disease of right breast    To continue regular monthly SBE.  To contact me promptly prn any worsening condition, or if she wishes a referral for a 2nd opinion with breast imaging.    Procedures    40 to 64: Counseling/Anticipatory Guidance Discussed: screenings and self-breast exam    Return in about 1 year (around 9/21/2023) for Annual physical.    Yani Ruano, APRN  09/21/2022

## 2022-09-22 LAB — REF LAB TEST METHOD: NORMAL

## 2022-09-26 ENCOUNTER — OFFICE VISIT (OUTPATIENT)
Dept: INTERNAL MEDICINE | Facility: CLINIC | Age: 47
End: 2022-09-26

## 2022-09-26 ENCOUNTER — LAB (OUTPATIENT)
Dept: LAB | Facility: HOSPITAL | Age: 47
End: 2022-09-26

## 2022-09-26 VITALS
BODY MASS INDEX: 22.53 KG/M2 | WEIGHT: 132 LBS | TEMPERATURE: 98.4 F | DIASTOLIC BLOOD PRESSURE: 80 MMHG | HEIGHT: 64 IN | HEART RATE: 72 BPM | OXYGEN SATURATION: 96 % | SYSTOLIC BLOOD PRESSURE: 120 MMHG

## 2022-09-26 DIAGNOSIS — E55.9 VITAMIN D DEFICIENCY: ICD-10-CM

## 2022-09-26 DIAGNOSIS — D51.8 OTHER VITAMIN B12 DEFICIENCY ANEMIA: ICD-10-CM

## 2022-09-26 DIAGNOSIS — F51.01 PRIMARY INSOMNIA: ICD-10-CM

## 2022-09-26 DIAGNOSIS — D51.8 OTHER VITAMIN B12 DEFICIENCY ANEMIA: Primary | ICD-10-CM

## 2022-09-26 DIAGNOSIS — F41.9 ANXIETY: ICD-10-CM

## 2022-09-26 DIAGNOSIS — M25.521 RIGHT ELBOW PAIN: ICD-10-CM

## 2022-09-26 PROCEDURE — 90686 IIV4 VACC NO PRSV 0.5 ML IM: CPT | Performed by: PHYSICIAN ASSISTANT

## 2022-09-26 PROCEDURE — 82306 VITAMIN D 25 HYDROXY: CPT

## 2022-09-26 PROCEDURE — 80053 COMPREHEN METABOLIC PANEL: CPT

## 2022-09-26 PROCEDURE — 85025 COMPLETE CBC W/AUTO DIFF WBC: CPT

## 2022-09-26 PROCEDURE — 82607 VITAMIN B-12: CPT

## 2022-09-26 PROCEDURE — 99214 OFFICE O/P EST MOD 30 MIN: CPT | Performed by: PHYSICIAN ASSISTANT

## 2022-09-26 PROCEDURE — 90471 IMMUNIZATION ADMIN: CPT | Performed by: PHYSICIAN ASSISTANT

## 2022-09-26 NOTE — ASSESSMENT & PLAN NOTE
Laboratory tests will be taken to check the patient's Vitamin B12 levels and vitamin D levels today, on 09/26/2022.

## 2022-09-26 NOTE — ASSESSMENT & PLAN NOTE
I review the results of the patient's right breast mammogram privately and with the patient. Based on the patient's notes and the patient's reported decrease in the volume of the lump, it is likely a sebaceous cyst and not problematic.

## 2022-09-26 NOTE — PROGRESS NOTES
Answers for HPI/ROS submitted by the patient on 9/20/2022  Please describe your symptoms.: follow up for b12, tennis elbow, anxiety medication  Have you had these symptoms before?: Yes  How long have you been having these symptoms?: Greater than 2 weeks  What is the primary reason for your visit?: Other    [facility] Internal Medicine    Yasmine Berry  1975   [id number]      Patient Care Team:  La Charles PA-C as PCP - General (Physician Assistant)  Yani Ruano APRN as Nurse Practitioner (Obstetrics and Gynecology)    Chief Complaint::   Chief Complaint   Patient presents with   • medication review             HPI  Patient verbalized consent to the visit recording.    Annie Brery is a 47-year-old female, date of birth 1975 who presents today for follow-up.    Vitamin B12 deficiency.  The patient reports that she has been receiving vitamin B12 injections at home, with her last vitamin B12 injection being taken on 09/01/2022. The patient states she noticed a difference when she was receiving the injections closer together in time, noting that the injections seem less effective when they are taken less often.    Vitamin D deficiency.  The patient states she has only been taking a vitamin D supplement intermittently and admits that she is not good at taking things regularly.    Insomnia.  The patient states the quality of her sleep has remained the same.    Anxiety.  The patient states she has been increasingly struggling with anxiety. The patient reports that she takes Buspar occasionally. The patient denies any new life developments that could be contributing to her increased anxiety. 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 The patient reports that she  "sometimes works 7 days a week. The patient that her mother is \"driving her insane\" because she contacts her too frequently. The patient states that her parents are both 72 years old. The patient admits that she feels like she is stuck and without an outlet. The patient believes that she may be having a mid-life crisis. The patient states that the cannot move to a different location because of her 's family farm. The patient states she is taking an anatomy and physiology college class and would like to work as a nurse. The patient reports that she has had appointments with a therapist in the past but notes that she does not want to see one again currently.    Right elbow pain.  The patient states she has had tennis elbow on her right elbow for an extended amount of time. The patient states she has tried going to physical therapy once a week for her right elbow, but she does not believe it to be helpful. The patient states she does strength training and cardio at her house.    Health maintenance of right breast.  The patient states she had a mammogram last week, and is concerned about the results.   The patient reports that she felt a ping pong sized lump in one of her right breast in approximately 09/2021, but she notes that it is much smaller in volume now. The patient reports that she found the mass in her right breast while shaving. The patient denies having a family history of breast cancer that she knows of.     General  The patient reports that her menstrual cycles are currently manageable. The patient reports that she always gets injections in her left arm.     The following portions of the patient's history were reviewed and updated as appropriate: active problem list, medication list, allergies, family history, social history        Patient Active Problem List   Diagnosis   • Dyslipidemia   • Dizziness   • Scoliosis   • Primary insomnia   • Irregular menses   • Right elbow pain   • Lipid screening   • " Encounter for vitamin deficiency screening   • Anemia due to vitamin B12 deficiency   • Routine medical exam   • Right ankle injury   • Lower respiratory tract infection due to COVID-19 virus   • Viral upper respiratory tract infection   • Anxiety   • Vitamin D deficiency        Past Medical History:   Diagnosis Date   • Hypertension    • Plantar plate injury, right, initial encounter    • Vitamin D deficiency        Past Surgical History:   Procedure Laterality Date   • ECHO - CONVERTED  02/24/2021    EF 70%. Trace-Mild MR. RVSP- 27 mmHg   • PLANTAR FASCIA SURGERY Right        Family History   Problem Relation Age of Onset   • Hypertension Mother    • Hyperlipidemia Mother    • Transient ischemic attack Mother 60   • Hypertension Father    • Heart disease Father 50        stent placement   • No Known Problems Sister    • Asthma Brother    • Hypertension Maternal Grandmother    • Diabetes Paternal Grandmother    • Hypertension Paternal Grandmother    • Hyperlipidemia Paternal Grandmother    • Heart attack Paternal Grandfather    • No Known Problems Son    • No Known Problems Daughter        Social History     Socioeconomic History   • Marital status:    Tobacco Use   • Smoking status: Never Smoker   • Smokeless tobacco: Never Used   Vaping Use   • Vaping Use: Never used   Substance and Sexual Activity   • Alcohol use: Yes     Alcohol/week: 2.0 standard drinks     Types: 2 Glasses of wine per week   • Drug use: Never   • Sexual activity: Yes     Partners: Male     Birth control/protection: Surgical     Comment: vasectomy       No Known Allergies    Review of Systems   Constitutional: Negative for activity change, appetite change, diaphoresis, fatigue, unexpected weight gain and unexpected weight loss.   HENT: Negative for hearing loss.    Eyes: Negative for blurred vision, double vision and visual disturbance.   Respiratory: Negative for chest tightness and shortness of breath.    Cardiovascular: Negative for  "chest pain, palpitations and leg swelling.   Gastrointestinal: Negative for abdominal pain, blood in stool, GERD and indigestion.   Endocrine: Negative for cold intolerance and heat intolerance.   Genitourinary: Negative for dysuria and hematuria.   Musculoskeletal: Positive for joint swelling. Negative for arthralgias and myalgias.   Skin: Negative for skin lesions.   Neurological: Negative for tremors, seizures, syncope, speech difficulty, weakness, headache, memory problem and confusion.   Hematological: Does not bruise/bleed easily.   Psychiatric/Behavioral: Positive for stress. Negative for sleep disturbance and depressed mood. The patient is nervous/anxious.         Vital Signs  Vitals:    09/26/22 1428   BP: 120/80   BP Location: Left arm   Patient Position: Sitting   Cuff Size: Adult   Pulse: 72   Temp: 98.4 °F (36.9 °C)   TempSrc: Temporal   SpO2: 96%   Weight: 59.9 kg (132 lb)   Height: 162.6 cm (64.02\")   PainSc:   4   PainLoc: Elbow     Body mass index is 22.65 kg/m².  BMI is within normal parameters. No other follow-up for BMI required.         Current Outpatient Medications:   •  busPIRone (BUSPAR) 5 MG tablet, Take 1 tablet by mouth At Night As Needed (anxiety)., Disp: 30 tablet, Rfl: 3  •  Cyanocobalamin 1000 MCG/ML kit, Inject 1 mL into the appropriate muscle as directed by prescriber 1 (One) Time Per Week., Disp: 1 kit, Rfl: 2  •  Diclofenac Sodium (VOLTAREN) 1 % gel gel, Apply 4 g topically to the appropriate area as directed 3 (Three) Times a Day., Disp: 50 g, Rfl: 1  •  Syringe 25G X 1\" 3 ML misc, Use to inject vitamin b12 injection, Disp: 10 each, Rfl: 0  No current facility-administered medications for this visit.    Physical Exam  Vitals reviewed.   Constitutional:       Appearance: Normal appearance. She is well-developed.   HENT:      Head: Normocephalic and atraumatic.      Right Ear: Hearing, tympanic membrane, ear canal and external ear normal.      Left Ear: Hearing, tympanic membrane, " ear canal and external ear normal.      Nose: Nose normal.      Mouth/Throat:      Pharynx: Oropharynx is clear. Uvula midline. No posterior oropharyngeal erythema.   Eyes:      General: Lids are normal.      Conjunctiva/sclera: Conjunctivae normal.      Pupils: Pupils are equal, round, and reactive to light.   Cardiovascular:      Rate and Rhythm: Normal rate and regular rhythm.      Heart sounds: Normal heart sounds.   Pulmonary:      Effort: Pulmonary effort is normal.      Breath sounds: Normal breath sounds.   Abdominal:      General: Bowel sounds are normal.      Palpations: Abdomen is soft.   Musculoskeletal:         General: Tenderness present. Normal range of motion.      Right elbow: Tenderness present.      Cervical back: Full passive range of motion without pain, normal range of motion and neck supple.   Skin:     General: Skin is warm and dry.   Neurological:      Mental Status: She is alert and oriented to person, place, and time.      Deep Tendon Reflexes: Reflexes are normal and symmetric.   Psychiatric:         Mood and Affect: Affect is tearful.         Speech: Speech normal.         Behavior: Behavior normal.         Thought Content: Thought content normal.         Judgment: Judgment normal.          ACE III MINI            Results Review:    Recent Results (from the past 672 hour(s))   LIQUID-BASED PAP SMEAR, [facility] ([facility])    Collection Time: 22 12:48 PM    Specimen: ThinPrep Vial   Result Value Ref Range    Reference Lab Report       [facility]  [address] [place] [address]  Phone: [phone] or [phone]  Fax: [phone]  Shankar Lancaster M.D., Medical Director    PATIENT NAME                                     LABORATORY NO.  [id number]   MICHAEL HIRSCH                                   [id number]  [id number]                                 AGE                    SEX   SSN              CLIENT REF #  [facility]              47        1975           [id number]      [id number]    [address] [facility] [address]           REQUESTING M.D.           ATTENDING M.D.         COPY TO.  [place], [place] [address]NELIDA DELONG  DATE COLLECTED            DATE RECEIVED          DATE REPORTED  09/21/2022 09/21/2022 09/22/2022    ThinPrep Pap with [facility]    DIAGNOSIS:  Negative for intraepithelial lesion or malignancy    Multiple factors can influence accuracy of Pap tests; therefore, screening at  regular  intervals is necessary for early cancer detection.      SPECIMEN ADEQUACY:  SATISFACTORY FOR EVALUATION  Transformation zone is absent or insufficient.  Data is  conflicting on the significance of ECC/TZ elements, an  annual repeat Pap smear is suggested.  SOURCE OF SPECIMEN:       CERVICAL/ENDOCERVICAL  LMP:   9/12/2022  SLIDES:  1  CLINICAL HISTORY:  Encounter for gynecological examination with abnormal finding      CYTOTECHNOLOGIST:             KATHARINA LOWE (ASCP)    CPT CODES:  29307     Comprehensive Metabolic Panel    Collection Time: 09/26/22  3:17 PM    Specimen: Blood   Result Value Ref Range    Glucose 79 65 - 99 mg/dL    BUN 14 6 - 20 mg/dL    Creatinine 0.90 0.57 - 1.00 mg/dL    Sodium 138 136 - 145 mmol/L    Potassium 4.2 3.5 - 5.2 mmol/L    Chloride 102 98 - 107 mmol/L    CO2 22.5 22.0 - 29.0 mmol/L    Calcium 9.7 8.6 - 10.5 mg/dL    Total Protein 6.7 6.0 - 8.5 g/dL    Albumin 4.30 3.50 - 5.20 g/dL    ALT (SGPT) 14 1 - 33 U/L    AST (SGOT) 25 1 - 32 U/L    Alkaline Phosphatase 60 39 - 117 U/L    Total Bilirubin 0.4 0.0 - 1.2 mg/dL    Globulin 2.4 gm/dL    A/G Ratio 1.8 g/dL    BUN/Creatinine Ratio 15.6 7.0 - 25.0    Anion Gap 13.5 5.0 - 15.0 mmol/L    eGFR 79.5 >60.0 mL/min/1.73   Vitamin B12    Collection Time: 09/26/22  3:17 PM    Specimen: Blood   Result Value Ref Range    Vitamin B-12 453 211 - 946 pg/mL   Vitamin D 25 Hydroxy    Collection Time: 09/26/22  3:17 PM    Specimen:  Blood   Result Value Ref Range    25 Hydroxy, Vitamin D 60.6 30.0 - 100.0 ng/ml   CBC Auto Differential    Collection Time: 09/26/22  3:17 PM    Specimen: Blood   Result Value Ref Range    WBC 6.13 3.40 - 10.80 10*3/mm3    RBC 4.61 3.77 - 5.28 10*6/mm3    Hemoglobin 13.6 12.0 - 15.9 g/dL    Hematocrit 41.9 34.0 - 46.6 %    MCV 90.9 79.0 - 97.0 fL    MCH 29.5 26.6 - 33.0 pg    MCHC 32.5 31.5 - 35.7 g/dL    RDW 11.8 (L) 12.3 - 15.4 %    RDW-SD 38.4 37.0 - 54.0 fl    MPV 11.7 6.0 - 12.0 fL    Platelets 245 140 - 450 10*3/mm3    Neutrophil % 54.3 42.7 - 76.0 %    Lymphocyte % 36.5 19.6 - 45.3 %    Monocyte % 7.3 5.0 - 12.0 %    Eosinophil % 0.7 0.3 - 6.2 %    Basophil % 1.0 0.0 - 1.5 %    Immature Grans % 0.2 0.0 - 0.5 %    Neutrophils, Absolute 3.33 1.70 - 7.00 10*3/mm3    Lymphocytes, Absolute 2.24 0.70 - 3.10 10*3/mm3    Monocytes, Absolute 0.45 0.10 - 0.90 10*3/mm3    Eosinophils, Absolute 0.04 0.00 - 0.40 10*3/mm3    Basophils, Absolute 0.06 0.00 - 0.20 10*3/mm3    Immature Grans, Absolute 0.01 0.00 - 0.05 10*3/mm3    nRBC 0.5 (H) 0.0 - 0.2 /100 WBC     Procedures    Medication Review: Medications reviewed and noted    Social History     Socioeconomic History   • Marital status:    Tobacco Use   • Smoking status: Never Smoker   • Smokeless tobacco: Never Used   Vaping Use   • Vaping Use: Never used   Substance and Sexual Activity   • Alcohol use: Yes     Alcohol/week: 2.0 standard drinks     Types: 2 Glasses of wine per week   • Drug use: Never   • Sexual activity: Yes     Partners: Male     Birth control/protection: Surgical     Comment: vasectomy        Assessment/Plan:    Problem List Items Addressed This Visit        Endocrine and Metabolic    Vitamin D deficiency    Relevant Orders    Vitamin D 25 Hydroxy (Completed)       Hematology and Neoplasia    Anemia due to vitamin B12 deficiency - Primary    Overview     We will recheck B12 levels.  If under 500 restart weekly injections.         Relevant  "Medications    Syringe 25G X 1\" 3 ML misc    Cyanocobalamin 1000 MCG/ML kit    Other Relevant Orders    Vitamin B12 (Completed)       Mental Health    Anxiety    Overview     I have encouraged patient to consider therapy.  She declines at this time.            Musculoskeletal and Injuries    Right elbow pain    Overview      Reviewed exercises that she performs at home, such as push-ups or other offending exercises.  She has seen PT, which helped, but pain quickly returned.              Sleep    Primary insomnia    Overview                Relevant Orders    CBC & Differential (Completed)    Comprehensive Metabolic Panel (Completed)             There are no Patient Instructions on file for this visit.     Plan of care reviewed with patient at the conclusion of today's visit. Education was provided regarding diagnosis, management, and any prescribed or recommended OTC medications.Patient verbalizes understanding of and agreement with management plan.         I spent 28 minutes caring for Yasmine on this date of service. This time includes time spent by me in the following activities:preparing for the visit, reviewing tests, obtaining and/or reviewing a separately obtained history, performing a medically appropriate examination and/or evaluation , counseling and educating the patient/family/caregiver, ordering medications, tests, or procedures, referring and communicating with other health care professionals  and documenting information in the medical record    La Charles PA-C      Note: Part of this note may be an electronic transcription/translation of spoken language to printed text using the Dragon Dictation system.    Transcribed from ambient dictation for La Charles PA-C by Rachel Rehman.  09/26/22   17:46 EDT    Patient verbalized consent to the visit recording.  I have personally performed the services described in this document as transcribed by the above individual, and it is both accurate " and complete.

## 2022-09-27 PROBLEM — E55.9 VITAMIN D DEFICIENCY: Status: ACTIVE | Noted: 2022-09-27

## 2022-09-27 PROBLEM — F41.9 ANXIETY: Status: ACTIVE | Noted: 2022-09-27

## 2022-09-27 LAB
25(OH)D3 SERPL-MCNC: 60.6 NG/ML (ref 30–100)
ALBUMIN SERPL-MCNC: 4.3 G/DL (ref 3.5–5.2)
ALBUMIN/GLOB SERPL: 1.8 G/DL
ALP SERPL-CCNC: 60 U/L (ref 39–117)
ALT SERPL W P-5'-P-CCNC: 14 U/L (ref 1–33)
ANION GAP SERPL CALCULATED.3IONS-SCNC: 13.5 MMOL/L (ref 5–15)
AST SERPL-CCNC: 25 U/L (ref 1–32)
BASOPHILS # BLD AUTO: 0.06 10*3/MM3 (ref 0–0.2)
BASOPHILS NFR BLD AUTO: 1 % (ref 0–1.5)
BILIRUB SERPL-MCNC: 0.4 MG/DL (ref 0–1.2)
BUN SERPL-MCNC: 14 MG/DL (ref 6–20)
BUN/CREAT SERPL: 15.6 (ref 7–25)
CALCIUM SPEC-SCNC: 9.7 MG/DL (ref 8.6–10.5)
CHLORIDE SERPL-SCNC: 102 MMOL/L (ref 98–107)
CO2 SERPL-SCNC: 22.5 MMOL/L (ref 22–29)
CREAT SERPL-MCNC: 0.9 MG/DL (ref 0.57–1)
DEPRECATED RDW RBC AUTO: 38.4 FL (ref 37–54)
EGFRCR SERPLBLD CKD-EPI 2021: 79.5 ML/MIN/1.73
EOSINOPHIL # BLD AUTO: 0.04 10*3/MM3 (ref 0–0.4)
EOSINOPHIL NFR BLD AUTO: 0.7 % (ref 0.3–6.2)
ERYTHROCYTE [DISTWIDTH] IN BLOOD BY AUTOMATED COUNT: 11.8 % (ref 12.3–15.4)
GLOBULIN UR ELPH-MCNC: 2.4 GM/DL
GLUCOSE SERPL-MCNC: 79 MG/DL (ref 65–99)
HCT VFR BLD AUTO: 41.9 % (ref 34–46.6)
HGB BLD-MCNC: 13.6 G/DL (ref 12–15.9)
IMM GRANULOCYTES # BLD AUTO: 0.01 10*3/MM3 (ref 0–0.05)
IMM GRANULOCYTES NFR BLD AUTO: 0.2 % (ref 0–0.5)
LYMPHOCYTES # BLD AUTO: 2.24 10*3/MM3 (ref 0.7–3.1)
LYMPHOCYTES NFR BLD AUTO: 36.5 % (ref 19.6–45.3)
MCH RBC QN AUTO: 29.5 PG (ref 26.6–33)
MCHC RBC AUTO-ENTMCNC: 32.5 G/DL (ref 31.5–35.7)
MCV RBC AUTO: 90.9 FL (ref 79–97)
MONOCYTES # BLD AUTO: 0.45 10*3/MM3 (ref 0.1–0.9)
MONOCYTES NFR BLD AUTO: 7.3 % (ref 5–12)
NEUTROPHILS NFR BLD AUTO: 3.33 10*3/MM3 (ref 1.7–7)
NEUTROPHILS NFR BLD AUTO: 54.3 % (ref 42.7–76)
NRBC BLD AUTO-RTO: 0.5 /100 WBC (ref 0–0.2)
PLATELET # BLD AUTO: 245 10*3/MM3 (ref 140–450)
PMV BLD AUTO: 11.7 FL (ref 6–12)
POTASSIUM SERPL-SCNC: 4.2 MMOL/L (ref 3.5–5.2)
PROT SERPL-MCNC: 6.7 G/DL (ref 6–8.5)
RBC # BLD AUTO: 4.61 10*6/MM3 (ref 3.77–5.28)
SODIUM SERPL-SCNC: 138 MMOL/L (ref 136–145)
VIT B12 BLD-MCNC: 453 PG/ML (ref 211–946)
WBC NRBC COR # BLD: 6.13 10*3/MM3 (ref 3.4–10.8)

## 2022-11-28 DIAGNOSIS — N92.0 MENORRHAGIA WITH REGULAR CYCLE: ICD-10-CM

## 2022-11-28 DIAGNOSIS — Z12.11 SCREENING FOR COLON CANCER: Primary | ICD-10-CM

## 2023-02-14 ENCOUNTER — OFFICE VISIT (OUTPATIENT)
Dept: CARDIOLOGY | Facility: CLINIC | Age: 48
End: 2023-02-14
Payer: COMMERCIAL

## 2023-02-14 VITALS
SYSTOLIC BLOOD PRESSURE: 114 MMHG | BODY MASS INDEX: 23.08 KG/M2 | HEART RATE: 64 BPM | DIASTOLIC BLOOD PRESSURE: 78 MMHG | WEIGHT: 135.2 LBS | HEIGHT: 64 IN

## 2023-02-14 DIAGNOSIS — E78.5 DYSLIPIDEMIA: Primary | ICD-10-CM

## 2023-02-14 DIAGNOSIS — I10 PRIMARY HYPERTENSION: ICD-10-CM

## 2023-02-14 PROCEDURE — 99212 OFFICE O/P EST SF 10 MIN: CPT | Performed by: NURSE PRACTITIONER

## 2023-02-14 NOTE — PROGRESS NOTES
"Chief Complaint   Patient presents with   • Follow-up     Cardiac management   • Lab     Last labs in chart.   • Med Refill     PCP writes refills on medications.     Subjective       Yasmine Berry is a 47 y.o. female with no significant past cardiac history who was referred for evaluation  in February 2021 secondary to dizziness and mildly elevated blood pressure.  She has scoliosis with back pain managed with ibuprofen.  She was encouraged to decrease NSAID use, decrease sodium intake.  HCTZ 12.5-25 mg prescribed for /100 at initial visit.  She underwent echocardiogram which showed no LVH, normal LVEF, no significant valvular dysfunction.  Carotid ultrasound showed no stenosis or tortuosity.  Mediterranean diet recommended for .     She returns today for follow-up. She is completely asymptomatic. She was able to stop HCTZ as blood pressure normalized. She denies recurrent dizziness, no syncope. No chest discomfort or dyspnea. She exercises, manages diet.        Cardiac History:    Past Surgical History:   Procedure Laterality Date   • ECHO - CONVERTED  02/24/2021    EF 70%. Trace-Mild MR. RVSP- 27 mmHg   • PLANTAR FASCIA SURGERY Right      Current Outpatient Medications   Medication Sig Dispense Refill   • Cyanocobalamin 1000 MCG/ML kit Inject 1 mL into the appropriate muscle as directed by prescriber 1 (One) Time Per Week. (Patient taking differently: Inject 1 mL into the appropriate muscle as directed by prescriber Every 30 (Thirty) Days.) 1 kit 2   • Syringe 25G X 1\" 3 ML misc Use to inject vitamin b12 injection 10 each 0     No current facility-administered medications for this visit.     Patient has no known allergies.    Past Medical History:   Diagnosis Date   • Hypertension    • Plantar plate injury, right, initial encounter    • Vitamin D deficiency      Social History     Socioeconomic History   • Marital status:    Tobacco Use   • Smoking status: Never   • Smokeless tobacco: Never " "  Vaping Use   • Vaping Use: Never used   Substance and Sexual Activity   • Alcohol use: Not Currently     Comment: rarely glass of wine   • Drug use: Never   • Sexual activity: Yes     Partners: Male     Birth control/protection: Surgical     Comment: vasectomy     Family History   Problem Relation Age of Onset   • Hypertension Mother    • Hyperlipidemia Mother    • Transient ischemic attack Mother 60   • Hypertension Father    • Heart disease Father 50        stent placement   • No Known Problems Sister    • Asthma Brother    • Hypertension Maternal Grandmother    • Diabetes Paternal Grandmother    • Hypertension Paternal Grandmother    • Hyperlipidemia Paternal Grandmother    • Heart attack Paternal Grandfather    • No Known Problems Son    • No Known Problems Daughter      Review of Systems   Constitutional: Negative for decreased appetite and malaise/fatigue.   HENT: Negative.    Eyes: Negative for blurred vision.   Cardiovascular: Negative for chest pain, dyspnea on exertion, leg swelling, palpitations and syncope.   Respiratory: Negative for shortness of breath and sleep disturbances due to breathing.    Endocrine: Negative.    Hematologic/Lymphatic: Negative for bleeding problem. Does not bruise/bleed easily.   Skin: Negative.    Musculoskeletal: Negative for falls and myalgias.   Gastrointestinal: Negative for abdominal pain, heartburn and melena.   Genitourinary: Negative for hematuria.   Neurological: Negative for dizziness and light-headedness.   Psychiatric/Behavioral: Negative for altered mental status.   Allergic/Immunologic: Negative.       Objective     /78 (BP Location: Left arm)   Pulse 64   Ht 162.6 cm (64.02\")   Wt 61.3 kg (135 lb 3.2 oz)   BMI 23.20 kg/m²     Vitals and nursing note reviewed.   Constitutional:       General: Not in acute distress.     Appearance: Well-developed. Not diaphoretic.   Eyes:      Pupils: Pupils are equal, round, and reactive to light.   HENT:      Head: " Normocephalic.   Pulmonary:      Effort: Pulmonary effort is normal. No respiratory distress.      Breath sounds: Normal breath sounds.   Cardiovascular:      Normal rate. Regular rhythm.   Pulses:     Intact distal pulses.   Abdominal:      General: Bowel sounds are normal.      Palpations: Abdomen is soft.   Musculoskeletal: Normal range of motion.      Cervical back: Normal range of motion. Skin:     General: Skin is warm and dry.   Neurological:      Mental Status: Alert and oriented to person, place, and time.        Procedures          Problem List Items Addressed This Visit        Cardiac and Vasculature    Dyslipidemia - Primary    Overview     Slightly elevated LDL cholesterol.  Continue regular exercise routine.  Continue low fat, low cholesterol diet.         Primary hypertension      1. Episodic HTN- blood pressure normal without medications. She is managing diet, exercise and weight extremely well. DASH/Mediterranean diet to be continued.     2. Mild dyslipidemia- borderline elevated with , normal HDL. Continue diet and exercise.     3. No recurrent dizziness.     She appears stable from a cardiac standpoint. She takes no cardiac medications.     We will see her back as needed.     BMI is within normal parameters. No other follow-up for BMI required.            Electronically signed by ANGE Guajardo,  February 16, 2023 11:34 EST

## 2023-02-16 PROBLEM — I10 PRIMARY HYPERTENSION: Status: ACTIVE | Noted: 2023-02-16

## 2023-05-01 ENCOUNTER — OFFICE VISIT (OUTPATIENT)
Dept: INTERNAL MEDICINE | Facility: CLINIC | Age: 48
End: 2023-05-01
Payer: COMMERCIAL

## 2023-05-01 ENCOUNTER — LAB (OUTPATIENT)
Dept: LAB | Facility: HOSPITAL | Age: 48
End: 2023-05-01
Payer: COMMERCIAL

## 2023-05-01 VITALS
WEIGHT: 132.6 LBS | HEIGHT: 64 IN | DIASTOLIC BLOOD PRESSURE: 78 MMHG | SYSTOLIC BLOOD PRESSURE: 120 MMHG | TEMPERATURE: 98.4 F | BODY MASS INDEX: 22.64 KG/M2 | OXYGEN SATURATION: 98 % | HEART RATE: 80 BPM

## 2023-05-01 DIAGNOSIS — E78.5 DYSLIPIDEMIA: ICD-10-CM

## 2023-05-01 DIAGNOSIS — Z13.29 THYROID DISORDER SCREENING: ICD-10-CM

## 2023-05-01 DIAGNOSIS — E55.9 VITAMIN D DEFICIENCY: ICD-10-CM

## 2023-05-01 DIAGNOSIS — I10 PRIMARY HYPERTENSION: ICD-10-CM

## 2023-05-01 DIAGNOSIS — D51.8 OTHER VITAMIN B12 DEFICIENCY ANEMIA: ICD-10-CM

## 2023-05-01 DIAGNOSIS — Z00.00 ROUTINE MEDICAL EXAM: Primary | ICD-10-CM

## 2023-05-01 PROCEDURE — 99396 PREV VISIT EST AGE 40-64: CPT | Performed by: PHYSICIAN ASSISTANT

## 2023-05-01 PROCEDURE — 84481 FREE ASSAY (FT-3): CPT

## 2023-05-01 PROCEDURE — 82306 VITAMIN D 25 HYDROXY: CPT

## 2023-05-01 PROCEDURE — 82607 VITAMIN B-12: CPT

## 2023-05-01 PROCEDURE — 80061 LIPID PANEL: CPT

## 2023-05-01 PROCEDURE — 80050 GENERAL HEALTH PANEL: CPT

## 2023-05-01 PROCEDURE — 84439 ASSAY OF FREE THYROXINE: CPT

## 2023-05-01 NOTE — PROGRESS NOTES
[facility] Internal Medicine    Yasmine Berry  1975   [id number]      Patient Care Team:  La Charles PA-C as PCP - General (Physician Assistant)  Yani Ruano APRN as Nurse Practitioner (Obstetrics and Gynecology)    Chief Complaint::   Chief Complaint   Patient presents with   • Annual Exam        HPI  Yasmine Berry is a 47-year-old female date of birth 1975 who presents today for routine physical.  Reports she recently quit her job after being accepted into [employer].  She is excited for the job change.  Current on Pap and mammogram through gynecology.  Also completed Cologuard last year.  Will have breast augmentation in 3 weeks by physician in [place].  She denies chest pain, shortness of breath, palpitations, or headaches.  She reports periods are regular.  Tdap.  She has not had three part hepatitis B.       Patient Active Problem List   Diagnosis   • Dyslipidemia   • Dizziness   • Scoliosis   • Primary insomnia   • Irregular menses   • Right elbow pain   • Lipid screening   • Encounter for vitamin deficiency screening   • Anemia due to vitamin B12 deficiency   • Routine medical exam   • Right ankle injury   • Lower respiratory tract infection due to COVID-19 virus   • Viral upper respiratory tract infection   • Anxiety   • Vitamin D deficiency   • Primary hypertension   • Thyroid disorder screening        Past Medical History:   Diagnosis Date   • Hypertension    • Plantar plate injury, right, initial encounter    • Urinary tract infection 12/01/20   • Vitamin D deficiency        Past Surgical History:   Procedure Laterality Date   • ECHO - CONVERTED  02/24/2021    EF 70%. Trace-Mild MR. RVSP- 27 mmHg   • PLANTAR FASCIA SURGERY Right        Family History   Problem Relation Age of Onset   • Hypertension Mother    • Hyperlipidemia Mother    • Transient ischemic attack Mother 60   • Hypertension Father    • Heart disease Father         stent placement   • No Known Problems  Sister    • Asthma Brother    • Hypertension Maternal Grandmother    • Diabetes Paternal Grandmother    • Hypertension Paternal Grandmother    • Hyperlipidemia Paternal Grandmother    • Heart attack Paternal Grandfather    • No Known Problems Son    • No Known Problems Daughter        Social History     Socioeconomic History   • Marital status:    Tobacco Use   • Smoking status: Never   • Smokeless tobacco: Never   Vaping Use   • Vaping Use: Never used   Substance and Sexual Activity   • Alcohol use: Yes     Comment: occasionally   • Drug use: Never   • Sexual activity: Yes     Partners: Male     Birth control/protection: Surgical     Comment: vasectomy       No Known Allergies    Review of Systems   Constitutional: Negative for activity change, appetite change, diaphoresis, fatigue, unexpected weight gain and unexpected weight loss.   HENT: Negative for hearing loss.    Eyes: Negative for visual disturbance.   Respiratory: Negative for chest tightness and shortness of breath.    Cardiovascular: Negative for chest pain, palpitations and leg swelling.   Gastrointestinal: Negative for abdominal pain, blood in stool, GERD and indigestion.   Endocrine: Negative for cold intolerance and heat intolerance.   Genitourinary: Negative for dysuria, hematuria and menstrual problem.   Musculoskeletal: Negative for arthralgias and myalgias.   Skin: Negative for skin lesions.   Neurological: Negative for tremors, seizures, syncope, speech difficulty, weakness, headache, memory problem and confusion.   Hematological: Does not bruise/bleed easily.   Psychiatric/Behavioral: Negative for sleep disturbance and depressed mood. The patient is not nervous/anxious.         Vital Signs  Vitals:    05/01/23 1515 05/01/23 1543   BP: 144/78 120/78   BP Location: Left arm    Patient Position: Sitting    Cuff Size: Adult    Pulse: 80    Temp: 98.4 °F (36.9 °C)    TempSrc: Infrared    SpO2: 98%    Weight: 60.1 kg (132 lb 9.6 oz)    Height:  "162.6 cm (64.02\")    PainSc: 0-No pain      Body mass index is 22.75 kg/m².  BMI is within normal parameters. No other follow-up for BMI required.     Advance Care Planning   ACP discussion was held with the patient during this visit. Patient does not have an advance directive, information provided.       Current Outpatient Medications:   •  Cyanocobalamin 1000 MCG/ML kit, Inject 1 mL into the appropriate muscle as directed by prescriber 1 (One) Time Per Week. (Patient taking differently: Inject 1 mL into the appropriate muscle as directed by prescriber Every 30 (Thirty) Days.), Disp: 1 kit, Rfl: 2  •  Syringe 25G X 1\" 3 ML misc, Use to inject vitamin b12 injection, Disp: 10 each, Rfl: 0    Physical Exam  Vitals reviewed.   Constitutional:       Appearance: Normal appearance. She is well-developed.   HENT:      Head: Normocephalic and atraumatic.      Right Ear: Hearing, tympanic membrane, ear canal and external ear normal.      Left Ear: Hearing, tympanic membrane, ear canal and external ear normal.      Nose: Nose normal.      Mouth/Throat:      Mouth: Mucous membranes are moist.      Pharynx: Oropharynx is clear. Uvula midline.   Eyes:      General: Lids are normal.      Conjunctiva/sclera: Conjunctivae normal.      Pupils: Pupils are equal, round, and reactive to light.   Cardiovascular:      Rate and Rhythm: Normal rate and regular rhythm.      Pulses: Normal pulses.      Heart sounds: Normal heart sounds.   Pulmonary:      Effort: Pulmonary effort is normal.      Breath sounds: Normal breath sounds.   Abdominal:      General: Bowel sounds are normal.      Palpations: Abdomen is soft.   Musculoskeletal:         General: Normal range of motion.      Cervical back: Full passive range of motion without pain, normal range of motion and neck supple.   Skin:     General: Skin is warm and dry.   Neurological:      General: No focal deficit present.      Mental Status: She is alert and oriented to person, place, and " time. Mental status is at baseline.      Deep Tendon Reflexes: Reflexes are normal and symmetric.   Psychiatric:         Speech: Speech normal.         Behavior: Behavior normal.         Thought Content: Thought content normal.         Judgment: Judgment normal.          ACE III MINI            Results Review:    No results found for this or any previous visit (from the past 672 hour(s)).  Procedures    Medication Review: Medications reviewed and noted    Social History     Socioeconomic History   • Marital status:    Tobacco Use   • Smoking status: Never   • Smokeless tobacco: Never   Vaping Use   • Vaping Use: Never used   Substance and Sexual Activity   • Alcohol use: Yes     Comment: occasionally   • Drug use: Never   • Sexual activity: Yes     Partners: Male     Birth control/protection: Surgical     Comment: vasectomy        Assessment/Plan:    Diagnoses and all orders for this visit:    1. Routine medical exam (Primary)  Overview:  She is fully vaccinated to COVID.  Discussed labs, questions answered.    She is current on age related screenings.  Continue regular cardiovascular exercise routine.  Continue low fat, low cholesterol diet.        2. Vitamin D deficiency  -     Vitamin D,25-Hydroxy; Future    3. Dyslipidemia  Overview:  Slightly elevated LDL cholesterol.  Continue regular exercise routine.  Continue low fat, low cholesterol diet.    Orders:  -     Lipid Panel; Future    4. Primary hypertension  -     CBC & Differential; Future  -     Comprehensive Metabolic Panel; Future    5. Other vitamin B12 deficiency anemia  Overview:  We will recheck B12 levels.  If under 500 restart weekly injections.    Orders:  -     Vitamin B12; Future    6. Thyroid disorder screening  -     TSH; Future  -     T4, Free; Future  -     T3, Free; Future       Patient Instructions   BMI for Adults  What is BMI?  Body mass index (BMI) is a number that is calculated from a person's weight and height. BMI can help  "estimate how much of a person's weight is composed of fat. BMI does not measure body fat directly. Rather, it is an alternative to procedures that directly measure body fat, which can be difficult and expensive.  BMI can help identify people who may be at higher risk for certain medical problems.  What are BMI measurements used for?  BMI is used as a screening tool to identify possible weight problems. It helps determine whether a person is obese, overweight, a healthy weight, or underweight.  BMI is useful for:  • Identifying a weight problem that may be related to a medical condition or may increase the risk for medical problems.  • Promoting changes, such as changes in diet and exercise, to help reach a healthy weight. BMI screening can be repeated to see if these changes are working.  How is BMI calculated?  BMI involves measuring your weight in relation to your height. Both height and weight are measured, and the BMI is calculated from those numbers. This can be done either in English (U.S[place] or metric measurements. Note that charts and online BMI calculators are available to help you find your BMI quickly and easily without having to do these calculations yourself.  To calculate your BMI in English (U.S[place] measurements:    1. Measure your weight in pounds (lb).  2. Multiply the number of pounds by 703.  ? For example, for a person who weighs 180 lb, multiply that number by 703, which equals 126,540.  3. Measure your height in inches. Then multiply that number by itself to get a measurement called \"inches squared.\"  ? For example, for a person who is 70 inches tall, the \"inches squared\" measurement is 70 inches x 70 inches, which equals 4,900 inches squared.  4. Divide the total from step 2 (number of lb x 703) by the total from step 3 (inches squared): 126,540 ÷ 4,900 = 25.8. This is your BMI.  To calculate your BMI in metric measurements:  1. Measure your weight in kilograms (kg).  2. Measure your height in " "meters (m). Then multiply that number by itself to get a measurement called \"meters squared.\"  ? For example, for a person who is 1.75 m tall, the \"meters squared\" measurement is 1.75 m x 1.75 m, which is equal to 3.1 meters squared.  3. Divide the number of kilograms (your weight) by the meters squared number. In this example: 70 ÷ 3.1 = 22.6. This is your BMI.  What do the results mean?  BMI charts are used to identify whether you are underweight, normal weight, overweight, or obese. The following guidelines will be used:  • Underweight: BMI less than 18.5.  • Normal weight: BMI between 18.5 and 24.9.  • Overweight: BMI between 25 and 29.9.  • Obese: BMI of 30 or above.  Keep these notes in mind:  • Weight includes both fat and muscle, so someone with a muscular build, such as an athlete, may have a BMI that is higher than 24.9. In cases like these, BMI is not an accurate measure of body fat.  • To determine if excess body fat is the cause of a BMI of 25 or higher, further assessments may need to be done by a health care provider.  • BMI is usually interpreted in the same way for men and women.  Where to find more information  For more information about BMI, including tools to quickly calculate your BMI, go to these websites:  • Centers for Disease Control and Prevention: www.cdc.gov  • American Heart Association: www.heart.org  • National Heart, Lung, and Blood [place]: www.nhlbi.nih.gov  Summary  • Body mass index (BMI) is a number that is calculated from a person's weight and height.  • BMI may help estimate how much of a person's weight is composed of fat. BMI can help identify those who may be at higher risk for certain medical problems.  • BMI can be measured using English measurements or metric measurements.  • BMI charts are used to identify whether you are underweight, normal weight, overweight, or obese.  This information is not intended to replace advice given to you by your health care provider. Make " sure you discuss any questions you have with your health care provider.  Document Revised: 09/09/2020 Document Reviewed: 07/17/2020  Elsevier Patient Education © 2022 Elsevier Inc.         Plan of care reviewed with patient at the conclusion of today's visit. Education was provided regarding diagnosis, management, and any prescribed or recommended OTC medications.Patient verbalizes understanding of and agreement with management plan.         I spent 20 minutes caring for Yasmine on this date of service. This time includes time spent by me in the following activities:preparing for the visit, reviewing tests, obtaining and/or reviewing a separately obtained history, performing a medically appropriate examination and/or evaluation , counseling and educating the patient/family/caregiver, ordering medications, tests, or procedures, referring and communicating with other health care professionals  and documenting information in the medical record    [place] PA-C      Note: Part of this note may be an electronic transcription/translation of spoken language to printed text using the Dragon Dictation system.

## 2023-05-02 LAB
25(OH)D3 SERPL-MCNC: 42.8 NG/ML (ref 30–100)
ALBUMIN SERPL-MCNC: 4.5 G/DL (ref 3.5–5.2)
ALBUMIN/GLOB SERPL: 2 G/DL
ALP SERPL-CCNC: 58 U/L (ref 39–117)
ALT SERPL W P-5'-P-CCNC: 13 U/L (ref 1–33)
ANION GAP SERPL CALCULATED.3IONS-SCNC: 10.6 MMOL/L (ref 5–15)
AST SERPL-CCNC: 18 U/L (ref 1–32)
BASOPHILS # BLD AUTO: 0.04 10*3/MM3 (ref 0–0.2)
BASOPHILS NFR BLD AUTO: 0.7 % (ref 0–1.5)
BILIRUB SERPL-MCNC: 0.7 MG/DL (ref 0–1.2)
BUN SERPL-MCNC: 8 MG/DL (ref 6–20)
BUN/CREAT SERPL: 9.2 (ref 7–25)
CALCIUM SPEC-SCNC: 9.5 MG/DL (ref 8.6–10.5)
CHLORIDE SERPL-SCNC: 105 MMOL/L (ref 98–107)
CHOLEST SERPL-MCNC: 177 MG/DL (ref 0–200)
CO2 SERPL-SCNC: 24.4 MMOL/L (ref 22–29)
CREAT SERPL-MCNC: 0.87 MG/DL (ref 0.57–1)
DEPRECATED RDW RBC AUTO: 39.7 FL (ref 37–54)
EGFRCR SERPLBLD CKD-EPI 2021: 82.8 ML/MIN/1.73
EOSINOPHIL # BLD AUTO: 0.02 10*3/MM3 (ref 0–0.4)
EOSINOPHIL NFR BLD AUTO: 0.3 % (ref 0.3–6.2)
ERYTHROCYTE [DISTWIDTH] IN BLOOD BY AUTOMATED COUNT: 12 % (ref 12.3–15.4)
GLOBULIN UR ELPH-MCNC: 2.3 GM/DL
GLUCOSE SERPL-MCNC: 85 MG/DL (ref 65–99)
HCT VFR BLD AUTO: 40 % (ref 34–46.6)
HDLC SERPL-MCNC: 67 MG/DL (ref 40–60)
HGB BLD-MCNC: 13.4 G/DL (ref 12–15.9)
IMM GRANULOCYTES # BLD AUTO: 0.01 10*3/MM3 (ref 0–0.05)
IMM GRANULOCYTES NFR BLD AUTO: 0.2 % (ref 0–0.5)
LDLC SERPL CALC-MCNC: 97 MG/DL (ref 0–100)
LDLC/HDLC SERPL: 1.44 {RATIO}
LYMPHOCYTES # BLD AUTO: 2.08 10*3/MM3 (ref 0.7–3.1)
LYMPHOCYTES NFR BLD AUTO: 35.5 % (ref 19.6–45.3)
MCH RBC QN AUTO: 30.4 PG (ref 26.6–33)
MCHC RBC AUTO-ENTMCNC: 33.5 G/DL (ref 31.5–35.7)
MCV RBC AUTO: 90.7 FL (ref 79–97)
MONOCYTES # BLD AUTO: 0.39 10*3/MM3 (ref 0.1–0.9)
MONOCYTES NFR BLD AUTO: 6.7 % (ref 5–12)
NEUTROPHILS NFR BLD AUTO: 3.32 10*3/MM3 (ref 1.7–7)
NEUTROPHILS NFR BLD AUTO: 56.6 % (ref 42.7–76)
NRBC BLD AUTO-RTO: 0 /100 WBC (ref 0–0.2)
PLATELET # BLD AUTO: 258 10*3/MM3 (ref 140–450)
PMV BLD AUTO: 11.9 FL (ref 6–12)
POTASSIUM SERPL-SCNC: 3.9 MMOL/L (ref 3.5–5.2)
PROT SERPL-MCNC: 6.8 G/DL (ref 6–8.5)
RBC # BLD AUTO: 4.41 10*6/MM3 (ref 3.77–5.28)
SODIUM SERPL-SCNC: 140 MMOL/L (ref 136–145)
T3FREE SERPL-MCNC: 2.76 PG/ML (ref 2–4.4)
T4 FREE SERPL-MCNC: 1.14 NG/DL (ref 0.93–1.7)
TRIGL SERPL-MCNC: 68 MG/DL (ref 0–150)
TSH SERPL DL<=0.05 MIU/L-ACNC: 1.31 UIU/ML (ref 0.27–4.2)
VIT B12 BLD-MCNC: 451 PG/ML (ref 211–946)
VLDLC SERPL-MCNC: 13 MG/DL (ref 5–40)
WBC NRBC COR # BLD: 5.86 10*3/MM3 (ref 3.4–10.8)

## 2023-05-02 NOTE — PATIENT INSTRUCTIONS

## 2024-01-03 ENCOUNTER — OFFICE VISIT (OUTPATIENT)
Dept: OBSTETRICS AND GYNECOLOGY | Facility: CLINIC | Age: 49
End: 2024-01-03
Payer: COMMERCIAL

## 2024-01-03 VITALS
DIASTOLIC BLOOD PRESSURE: 76 MMHG | BODY MASS INDEX: 23.39 KG/M2 | HEIGHT: 64 IN | SYSTOLIC BLOOD PRESSURE: 142 MMHG | WEIGHT: 137 LBS

## 2024-01-03 DIAGNOSIS — Z12.31 ENCOUNTER FOR SCREENING MAMMOGRAM FOR MALIGNANT NEOPLASM OF BREAST: ICD-10-CM

## 2024-01-03 DIAGNOSIS — Z01.419 ENCOUNTER FOR GYNECOLOGICAL EXAMINATION WITHOUT ABNORMAL FINDING: Primary | ICD-10-CM

## 2024-01-03 PROCEDURE — 99396 PREV VISIT EST AGE 40-64: CPT | Performed by: NURSE PRACTITIONER

## 2024-01-03 RX ORDER — ESCITALOPRAM OXALATE 10 MG/1
1 TABLET ORAL DAILY
COMMUNITY
Start: 2023-09-12

## 2024-01-03 NOTE — PROGRESS NOTES
"Chief Complaint  Yasmine Berry is a 48 y.o.  female presenting for Annual Exam    History of Present Illness  Yasmine is a very pleasant 49yo woman, , here for annual gyn exam.  Vas for BC.  Her PSHx includes, in part, bilateral breast augmentation.  She has a PMHx of HTN, but not on any meds at this time.  She watches closely.  She has been through a cardiac work-up, and has trace to mild MR.    She does have some stress in her life & has anxiety.  This is much better since starting Lexapro in July.  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  She is still active and gets lots of exercise / running.    Her next mammogram appt is in May.  Last mammo was 22.  Last pap 22, negative    The following portions of the patient's history were reviewed and updated as appropriate: allergies, current medications, past family history, past medical history, past social history, past surgical history, and problem list.    No Known Allergies      Current Outpatient Medications:     escitalopram (LEXAPRO) 10 MG tablet, Take 1 tablet by mouth Daily., Disp: , Rfl:     Cyanocobalamin 1000 MCG/ML kit, Inject 1 mL into the appropriate muscle as directed by prescriber 1 (One) Time Per Week. (Patient taking differently: Inject 1 mL into the appropriate muscle as directed by prescriber Every 30 (Thirty) Days.), Disp: 1 kit, Rfl: 2    Syringe 25G X 1\" 3 ML misc, Use to inject vitamin b12 injection, Disp: 10 each, Rfl: 0    Past Medical History:   Diagnosis Date    Anxiety     Hypertension     Plantar plate injury, right, initial encounter     Urinary tract infection 20    Vitamin D deficiency         Past Surgical History:   Procedure Laterality Date    BREAST AUGMENTATION Bilateral     ECHO - CONVERTED  2021    EF 70%. Trace-Mild MR. RVSP- 27 mmHg    PLANTAR FASCIA SURGERY Right        Objective  /76   Ht 162.6 cm (64\")   Wt 62.1 kg " (137 lb)   LMP 12/24/2023 (Exact Date)   Breastfeeding No   BMI 23.52 kg/m²     Physical Exam  Vitals and nursing note reviewed. Exam conducted with a chaperone present.   Constitutional:       General: She is not in acute distress.     Appearance: Normal appearance. She is not ill-appearing.   HENT:      Head: Normocephalic.   Neck:      Thyroid: No thyroid mass or thyromegaly.   Cardiovascular:      Rate and Rhythm: Normal rate and regular rhythm.      Heart sounds: Normal heart sounds. No murmur heard.  Pulmonary:      Effort: Pulmonary effort is normal. No respiratory distress.      Breath sounds: Normal breath sounds.   Chest:   Breasts:     Right: No inverted nipple, mass or nipple discharge.      Left: No inverted nipple, mass or nipple discharge.      Comments: Bilat autmentation noted;  well healed from surgery.  Abdominal:      Palpations: Abdomen is soft. There is no mass.      Tenderness: There is no abdominal tenderness.   Genitourinary:     General: Normal vulva.      Labia:         Right: No rash, tenderness or lesion.         Left: No rash, tenderness or lesion.       Vagina: Normal. No erythema.      Cervix: No discharge, lesion or erythema.      Uterus: Not enlarged and not tender.       Adnexa:         Right: No mass or tenderness.          Left: No mass or tenderness.        Comments: Anus appears wnl.  No rectal exam performed.  Lymphadenopathy:      Upper Body:      Right upper body: No supraclavicular or axillary adenopathy.      Left upper body: No supraclavicular or axillary adenopathy.   Skin:     General: Skin is warm and dry.   Neurological:      Mental Status: She is alert and oriented to person, place, and time.   Psychiatric:         Mood and Affect: Mood normal.         Behavior: Behavior normal.         Assessment/Plan   Diagnoses and all orders for this visit:    1. Encounter for gynecological examination without abnormal finding (Primary)    2. Encounter for screening mammogram  for malignant neoplasm of breast  -     Mammo Screening Digital Tomosynthesis Bilateral With CAD; Future        Procedures    40 to 64: Counseling/Anticipatory Guidance Discussed: nutrition, physical activity, screenings, and self-breast exam    Return in about 1 year (around 1/3/2025) for Annual physical.    Yani Ruano, APRN  01/03/2024

## 2024-03-05 ENCOUNTER — OFFICE VISIT (OUTPATIENT)
Dept: INTERNAL MEDICINE | Facility: CLINIC | Age: 49
End: 2024-03-05
Payer: COMMERCIAL

## 2024-03-05 VITALS
TEMPERATURE: 99.5 F | HEART RATE: 76 BPM | BODY MASS INDEX: 23.83 KG/M2 | HEIGHT: 64 IN | SYSTOLIC BLOOD PRESSURE: 150 MMHG | DIASTOLIC BLOOD PRESSURE: 90 MMHG | WEIGHT: 139.6 LBS

## 2024-03-05 DIAGNOSIS — F41.9 ANXIETY: ICD-10-CM

## 2024-03-05 DIAGNOSIS — M79.642 PAIN OF LEFT HAND: ICD-10-CM

## 2024-03-05 DIAGNOSIS — I10 PRIMARY HYPERTENSION: Primary | ICD-10-CM

## 2024-03-05 PROCEDURE — 99214 OFFICE O/P EST MOD 30 MIN: CPT | Performed by: PHYSICIAN ASSISTANT

## 2024-03-05 RX ORDER — LOSARTAN POTASSIUM 25 MG/1
25 TABLET ORAL DAILY
Qty: 30 TABLET | Refills: 1 | Status: SHIPPED | OUTPATIENT
Start: 2024-03-05 | End: 2024-03-05

## 2024-03-05 RX ORDER — LOSARTAN POTASSIUM 25 MG/1
25 TABLET ORAL DAILY
Qty: 90 TABLET | Refills: 0 | Status: SHIPPED | OUTPATIENT
Start: 2024-03-05

## 2024-03-05 RX ORDER — BUSPIRONE HYDROCHLORIDE 5 MG/1
5 TABLET ORAL 2 TIMES DAILY
Qty: 60 TABLET | Refills: 2 | Status: SHIPPED | OUTPATIENT
Start: 2024-03-05

## 2024-03-05 NOTE — PROGRESS NOTES
[facility] Medicine    Yasmine Berry  1975   [id number]      Patient Care Team:  La Charles PA-C as PCP - General (Physician Assistant)  Yani Ruano APRN as Nurse Practitioner (Obstetrics and Gynecology)    Chief Complaint::   Chief Complaint   Patient presents with    Hypertension     concern    Hand Pain     left        HPI  Yasmine Berry is a 48-year-old female date of birth 1975 who presents today for follow-up.  Past medical history significant for dyslipidemia, insomnia, and vitamin D and B12 deficiency.    Annie is tearful today.  She reports significant amount of stress.  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  She reports this transition has been very stressful, continues to feel stress from her father, who owns the business.  There may be a new  in July, which will help.  She does have support in her .  Her son has come out as being homosexual, and this was initially upsetting for her.  She has, however, learned to adjust and accept.  She still had some disapproval in her family, mostly her parents.  She feels that her mother still tries to micromanage her life.  An APRN friend has recently prescribed Lexapro 10 mg daily.  She felt relief with this, tried to increase to the 20 mg dose.  However, experienced side effects and went back to the 10mg dose. Still has stress and anxiety.  BP elevated. She has had headaches, flushing.    [facility] sports medicine had PRP on elbow, significantly better. Some pain in her left hand, thumb.  Has not used anything on this.    Patient Active Problem List   Diagnosis    Dyslipidemia    Dizziness    Scoliosis    Primary insomnia    Irregular menses    Right elbow pain    Lipid screening    Encounter for vitamin deficiency screening    Anemia due to vitamin B12 deficiency    Routine medical exam    Right ankle injury    Lower respiratory tract infection due to  COVID-19 virus    Viral upper respiratory tract infection    Anxiety    Vitamin D deficiency    Primary hypertension    Thyroid disorder screening        Past Medical History:   Diagnosis Date    Anxiety     Hypertension     Plantar plate injury, right, initial encounter     Urinary tract infection 12/01/20    Vitamin D deficiency        Past Surgical History:   Procedure Laterality Date    BREAST AUGMENTATION Bilateral     ECHO - CONVERTED  02/24/2021    EF 70%. Trace-Mild MR. RVSP- 27 mmHg    PLANTAR FASCIA SURGERY Right        Family History   Problem Relation Age of Onset    Hypertension Mother     Hyperlipidemia Mother     Transient ischemic attack Mother 60    Hypertension Father     Heart disease Father 50        stent placement    No Known Problems Sister     Asthma Brother     Hypertension Maternal Grandmother     Diabetes Paternal Grandmother     Hypertension Paternal Grandmother     Hyperlipidemia Paternal Grandmother     Heart attack Paternal Grandfather     No Known Problems Son     No Known Problems Daughter        Social History     Socioeconomic History    Marital status:    Tobacco Use    Smoking status: Never    Smokeless tobacco: Never   Vaping Use    Vaping status: Never Used   Substance and Sexual Activity    Alcohol use: Yes     Comment: occasionally    Drug use: Never    Sexual activity: Yes     Partners: Male     Birth control/protection: Surgical     Comment: vasectomy       No Known Allergies    Review of Systems   Constitutional:  Negative for activity change, appetite change, diaphoresis, fatigue, unexpected weight gain and unexpected weight loss.   HENT:  Negative for hearing loss.    Eyes:  Negative for visual disturbance.   Respiratory:  Negative for chest tightness and shortness of breath.    Cardiovascular:  Negative for chest pain, palpitations and leg swelling.   Gastrointestinal:  Negative for abdominal pain, blood in stool, GERD and indigestion.   Endocrine: Negative for  "cold intolerance and heat intolerance.   Genitourinary:  Negative for dysuria and hematuria.   Musculoskeletal:  Positive for arthralgias. Negative for myalgias.   Skin:  Negative for skin lesions.   Neurological:  Positive for headache. Negative for tremors, seizures, syncope, speech difficulty, weakness, memory problem and confusion.   Hematological:  Does not bruise/bleed easily.   Psychiatric/Behavioral:  Positive for stress. Negative for sleep disturbance and depressed mood. The patient is nervous/anxious.         Vital Signs  Vitals:    03/05/24 1052 03/05/24 1132   BP: 128/82 150/90   BP Location: Right arm    Patient Position: Sitting    Cuff Size: Adult    Pulse: 76    Temp: 99.5 °F (37.5 °C)    TempSrc: Infrared    Weight: 63.3 kg (139 lb 9.6 oz)    Height: 162.6 cm (64.02\")    PainSc:   4    PainLoc: Hand      Body mass index is 23.95 kg/m².  BMI is within normal parameters. No other follow-up for BMI required.     Advance Care Planning   ACP discussion was held with the patient during this visit. Patient does not have an advance directive, information provided.       Current Outpatient Medications:     Cyanocobalamin 1000 MCG/ML kit, Inject 1 mL into the appropriate muscle as directed by prescriber 1 (One) Time Per Week. (Patient taking differently: Inject 1 mL into the appropriate muscle as directed by prescriber Every 30 (Thirty) Days.), Disp: 1 kit, Rfl: 2    escitalopram (LEXAPRO) 10 MG tablet, Take 1 tablet by mouth Daily., Disp: , Rfl:     Syringe 25G X 1\" 3 ML misc, Use to inject vitamin b12 injection, Disp: 10 each, Rfl: 0    busPIRone (BUSPAR) 5 MG tablet, Take 1 tablet by mouth 2 (Two) Times a Day., Disp: 60 tablet, Rfl: 2    Diclofenac Sodium (VOLTAREN) 1 % gel gel, Apply 4 g topically to the appropriate area as directed 3 (Three) Times a Day., Disp: 50 g, Rfl: 1    losartan (COZAAR) 25 MG tablet, TAKE 1 TABLET BY MOUTH DAILY, Disp: 90 tablet, Rfl: 0    Physical Exam  Vitals and nursing note " reviewed.   Constitutional:       General: She is not in acute distress.     Appearance: She is well-developed. She is not diaphoretic.   HENT:      Head: Normocephalic and atraumatic.   Neck:      Vascular: No JVD.   Cardiovascular:      Rate and Rhythm: Normal rate and regular rhythm.      Heart sounds: Normal heart sounds. No murmur heard.  Pulmonary:      Effort: Pulmonary effort is normal. No respiratory distress.      Breath sounds: Normal breath sounds.   Chest:      Chest wall: No tenderness.   Abdominal:      General: There is no distension.      Palpations: Abdomen is soft.      Tenderness: There is no abdominal tenderness.   Musculoskeletal:         General: Tenderness present.      Left hand: Swelling and tenderness present.      Cervical back: Normal range of motion and neck supple.   Skin:     General: Skin is warm and dry.      Coloration: Skin is not pale.      Findings: No erythema.   Neurological:      General: No focal deficit present.      Mental Status: Mental status is at baseline.   Psychiatric:         Mood and Affect: Mood normal. Affect is tearful.          ACE III MINI            Results Review:    No results found for this or any previous visit (from the past 672 hour(s)).  Procedures    Medication Review: Medications reviewed and noted    Social History     Socioeconomic History    Marital status:    Tobacco Use    Smoking status: Never    Smokeless tobacco: Never   Vaping Use    Vaping status: Never Used   Substance and Sexual Activity    Alcohol use: Yes     Comment: occasionally    Drug use: Never    Sexual activity: Yes     Partners: Male     Birth control/protection: Surgical     Comment: vasectomy        Assessment/Plan:    Diagnoses and all orders for this visit:    1. Primary hypertension (Primary)  Overview:  Trial of losartan 25 mg daily.  Patient encouraged to exercise regularly, as this will help with hypertension and anxiety.      2. Anxiety  Overview:  I have  "encouraged patient to consider therapy.  She is agreeable now.  Referral sent. Continue Lexapro 10 mg daily.  Add buspirone 5 mg twice daily.    Orders:  -     busPIRone (BUSPAR) 5 MG tablet; Take 1 tablet by mouth 2 (Two) Times a Day.  Dispense: 60 tablet; Refill: 2  -     Ambulatory Referral to Behavioral Health    3. Pain of left hand  -     Diclofenac Sodium (VOLTAREN) 1 % gel gel; Apply 4 g topically to the appropriate area as directed 3 (Three) Times a Day.  Dispense: 50 g; Refill: 1    Other orders  -     Discontinue: losartan (Cozaar) 25 MG tablet; Take 1 tablet by mouth Daily.  Dispense: 30 tablet; Refill: 1         Patient Instructions   BMI for Adults  Body mass index (BMI) is a number found using a person's weight and height. BMI can help tell how much of a person's weight is made up of fat. BMI does not measure body fat directly. It is used instead of tests that directly measure body fat, which can be difficult and expensive.  What are BMI measurements used for?  BMI is useful to:  Find out if your weight puts you at higher risk for medical problems.  Help recommend changes, such as in diet and exercise. This can help you reach a healthy weight. BMI screening can be done again to see if these changes are working.  How is BMI calculated?  Your height and weight are measured. The BMI is found from those numbers. This can be done with U[place] or metric measurements. Note that charts and online BMI calculators are available to help you find your BMI quickly and easily without doing these calculations.  To calculate your BMI in [place] measurements:  Measure your weight in pounds (lb).  Multiply the number of pounds by 703.  So, for an adult who weighs 150 lb, multiply that number by 703: 150 x 703, which equals 105,450.  Measure your height in inches. Then multiply that number by itself to get a measurement called \"inches squared.\"  So, for an adult who is 70 inches tall, the \"inches squared\" measurement is 70 " "inches x 70 inches, which equals 4,900 inches squared.  Divide the total from step 2 (number of lb x 703) by the total from step 3 (inches squared): 105,450 ÷ 4,900 = 21.5. This is your BMI.  To calculate your BMI in metric measurements:    Measure your weight in kilograms (kg).  For this example, the weight is 70 kg.  Measure your height in meters (m). Then multiply that number by itself to get a measurement called \"meters squared.\"  So, for an adult who is 1.75 m tall, the \"meters squared\" measurement is 1.75 m x 1.75 m, which equals 3.1 meters squared.  Divide the number of kilograms (your weight) by the meters squared number. In this example: 70 ÷ 3.1 = 22.6. This is your BMI.  What do the results mean?  BMI charts are used to see if you are underweight, normal weight, overweight, or obese. The following guidelines will be used:  Underweight: BMI less than 18.5.  Normal weight: BMI between 18.5 and 24.9.  Overweight: BMI between 25 and 29.9.  Obese: BMI of 30 or above.  BMI is a tool and cannot diagnose a condition. Talk with your health care provider about what your BMI means for you. Keep these notes in mind:  Weight includes fat and muscle. Someone with a muscular build, such as an athlete, may have a BMI that is higher than 24.9. In cases like these, BMI is not a correct measure of body fat.  If you have a BMI of 25 or higher, your provider may need to do more testing to find out if excess body fat is the cause.  BMI is measured the same way for males and females. Females usually have more body fat than males of the same height and weight.  Where to find more information  For more information about BMI, including tools to quickly find your BMI, go to:  Centers for Disease Control and Prevention: cdc.gov  American Heart Association: heart.org  National Heart, Lung, and Blood [place]: nhlbi.nih.gov  This information is not intended to replace advice given to you by your health care provider. Make sure you " discuss any questions you have with your health care provider.  Document Revised: 09/07/2023 Document Reviewed: 08/31/2023  Elsevier Patient Education © 2023 [employer]  Advance Directive    Advance directives are legal documents that allow you to make decisions about your health care and medical treatment in case you become unable to communicate for yourself. Advance directives let your wishes be known to family, friends, and health care providers.  Discussing and writing advance directives should happen over time rather than all at once. Advance directives can be changed and updated at any time. There are different types of advance directives, such as:  Medical power of .  Living will.  Do not resuscitate (DNR) order or do not attempt resuscitation (DNAR) order.  Health care proxy and medical power of   A health care proxy is also called a health care agent. This person is appointed to make medical decisions for you when you are unable to make decisions for yourself. Generally, people ask a trusted friend or family member to act as their proxy and represent their preferences. Make sure you have an agreement with your trusted person to act as your proxy. A proxy may have to make a medical decision on your behalf if your wishes are not known.  A medical power of , also called a durable power of  for health care, is a legal document that names your health care proxy. Depending on the laws in your state, the document may need to be:  Signed.  Notarized.  Dated.  Copied.  Witnessed.  Incorporated into your medical record.  You may also want to appoint a trusted person to manage your money in the event you are unable to do so. This is called a durable power of  for finances. It is a separate legal document from the durable power of  for health care. You may choose your health care proxy or someone different to act as your agent in money matters.  If you do not appoint a  proxy, or there is a concern that the proxy is not acting in your best interest, a court may appoint a guardian to act on your behalf.  Living will  A living will is a set of instructions that state your wishes about medical care when you cannot express them yourself. Health care providers should keep a copy of your living will in your medical record. You may want to give a copy to family members or friends. To alert caregivers in case of an emergency, you can place a card in your wallet to let them know that you have a living will and where they can find it. A living will is used if you become:  Terminally ill.  Disabled.  Unable to communicate or make decisions.  The following decisions should be included in your living will:  To use or not to use life support equipment, such as dialysis machines and breathing machines (ventilators).  Whether you want a DNR or DNAR order. This tells health care providers not to use cardiopulmonary resuscitation (CPR) if breathing or heartbeat stops.  To use or not to use tube feeding.  To be given or not to be given food and fluids.  Whether you want comfort (palliative) care when the goal becomes comfort rather than a cure.  Whether you want to donate your organs and tissues.  A living will does not give instructions for distributing your money and property if you should pass away.  DNR or DNAR  A DNR or DNAR order is a request not to have CPR in the event that your heart stops beating or you stop breathing. If a DNR or DNAR order has not been made and shared, a health care provider will try to help any patient whose heart has stopped or who has stopped breathing. If you plan to have surgery, talk with your health care provider about how your DNR or DNAR order will be followed if problems occur.  What if I do not have an advance directive?  Some states assign family decision makers to act on your behalf if you do not have an advance directive. Each state has its own laws about  advance directives. You may want to check with your health care provider, , or state representative about the laws in your state.  Summary  Advance directives are legal documents that allow you to make decisions about your health care and medical treatment in case you become unable to communicate for yourself.  The process of discussing and writing advance directives should happen over time. You can change and update advance directives at any time.  Advance directives may include a medical power of , a living will, and a DNR or DNAR order.  This information is not intended to replace advice given to you by your health care provider. Make sure you discuss any questions you have with your health care provider.  Document Revised: 09/21/2021 Document Reviewed: 09/21/2021  [employer] Patient Education © 2023 [employer]       Plan of care reviewed with patient at the conclusion of today's visit. Education was provided regarding diagnosis, management, and any prescribed or recommended OTC medications.Patient verbalizes understanding of and agreement with management plan.     Lexapro 10 mg daily.  Add buspirone  I have seen Yasmine on this date of service:preparing for the visit, reviewing tests, obtaining and/or reviewing a separately obtained history, performing a medically appropriate examination and/or evaluation , counseling and educating the patient/family/caregiver, ordering medications, tests, or procedures, referring and communicating with other health care professionals , and documenting information in the medical record    La Charles PA-C      Note: Part of this note may be an electronic transcription/translation of spoken language to printed text using the Dragon Dictation system.

## 2024-03-06 NOTE — PATIENT INSTRUCTIONS
"BMI for Adults  Body mass index (BMI) is a number found using a person's weight and height. BMI can help tell how much of a person's weight is made up of fat. BMI does not measure body fat directly. It is used instead of tests that directly measure body fat, which can be difficult and expensive.  What are BMI measurements used for?  BMI is useful to:  Find out if your weight puts you at higher risk for medical problems.  Help recommend changes, such as in diet and exercise. This can help you reach a healthy weight. BMI screening can be done again to see if these changes are working.  How is BMI calculated?  Your height and weight are measured. The BMI is found from those numbers. This can be done with U.[place] or metric measurements. Note that charts and online BMI calculators are available to help you find your BMI quickly and easily without doing these calculations.  To calculate your BMI in U.S[place] measurements:  Measure your weight in pounds (lb).  Multiply the number of pounds by 703.  So, for an adult who weighs 150 lb, multiply that number by 703: 150 x 703, which equals 105,450.  Measure your height in inches. Then multiply that number by itself to get a measurement called \"inches squared.\"  So, for an adult who is 70 inches tall, the \"inches squared\" measurement is 70 inches x 70 inches, which equals 4,900 inches squared.  Divide the total from step 2 (number of lb x 703) by the total from step 3 (inches squared): 105,450 ÷ 4,900 = 21.5. This is your BMI.  To calculate your BMI in metric measurements:    Measure your weight in kilograms (kg).  For this example, the weight is 70 kg.  Measure your height in meters (m). Then multiply that number by itself to get a measurement called \"meters squared.\"  So, for an adult who is 1.75 m tall, the \"meters squared\" measurement is 1.75 m x 1.75 m, which equals 3.1 meters squared.  Divide the number of kilograms (your weight) by the meters squared number. In this example: 70 " ÷ 3.1 = 22.6. This is your BMI.  What do the results mean?  BMI charts are used to see if you are underweight, normal weight, overweight, or obese. The following guidelines will be used:  Underweight: BMI less than 18.5.  Normal weight: BMI between 18.5 and 24.9.  Overweight: BMI between 25 and 29.9.  Obese: BMI of 30 or above.  BMI is a tool and cannot diagnose a condition. Talk with your health care provider about what your BMI means for you. Keep these notes in mind:  Weight includes fat and muscle. Someone with a muscular build, such as an athlete, may have a BMI that is higher than 24.9. In cases like these, BMI is not a correct measure of body fat.  If you have a BMI of 25 or higher, your provider may need to do more testing to find out if excess body fat is the cause.  BMI is measured the same way for males and females. Females usually have more body fat than males of the same height and weight.  Where to find more information  For more information about BMI, including tools to quickly find your BMI, go to:  Centers for Disease Control and Prevention: cdc.gov  American Heart Association: heart.org  National Heart, Lung, and Blood [place]: nhlbi.nih.gov  This information is not intended to replace advice given to you by your health care provider. Make sure you discuss any questions you have with your health care provider.  Document Revised: 09/07/2023 Document Reviewed: 08/31/2023  [employer] Patient Education © 2023 [employer]  Advance Directive    Advance directives are legal documents that allow you to make decisions about your health care and medical treatment in case you become unable to communicate for yourself. Advance directives let your wishes be known to family, friends, and health care providers.  Discussing and writing advance directives should happen over time rather than all at once. Advance directives can be changed and updated at any time. There are different types of advance directives,  such as:  Medical power of .  Living will.  Do not resuscitate (DNR) order or do not attempt resuscitation (DNAR) order.  Health care proxy and medical power of   A health care proxy is also called a health care agent. This person is appointed to make medical decisions for you when you are unable to make decisions for yourself. Generally, people ask a trusted friend or family member to act as their proxy and represent their preferences. Make sure you have an agreement with your trusted person to act as your proxy. A proxy may have to make a medical decision on your behalf if your wishes are not known.  A medical power of , also called a durable power of  for health care, is a legal document that names your health care proxy. Depending on the laws in your state, the document may need to be:  Signed.  Notarized.  Dated.  Copied.  Witnessed.  Incorporated into your medical record.  You may also want to appoint a trusted person to manage your money in the event you are unable to do so. This is called a durable power of  for finances. It is a separate legal document from the durable power of  for health care. You may choose your health care proxy or someone different to act as your agent in money matters.  If you do not appoint a proxy, or there is a concern that the proxy is not acting in your best interest, a court may appoint a guardian to act on your behalf.  Living will  A living will is a set of instructions that state your wishes about medical care when you cannot express them yourself. Health care providers should keep a copy of your living will in your medical record. You may want to give a copy to family members or friends. To alert caregivers in case of an emergency, you can place a card in your wallet to let them know that you have a living will and where they can find it. A living will is used if you become:  Terminally ill.  Disabled.  Unable to communicate or  make decisions.  The following decisions should be included in your living will:  To use or not to use life support equipment, such as dialysis machines and breathing machines (ventilators).  Whether you want a DNR or DNAR order. This tells health care providers not to use cardiopulmonary resuscitation (CPR) if breathing or heartbeat stops.  To use or not to use tube feeding.  To be given or not to be given food and fluids.  Whether you want comfort (palliative) care when the goal becomes comfort rather than a cure.  Whether you want to donate your organs and tissues.  A living will does not give instructions for distributing your money and property if you should pass away.  DNR or DNAR  A DNR or DNAR order is a request not to have CPR in the event that your heart stops beating or you stop breathing. If a DNR or DNAR order has not been made and shared, a health care provider will try to help any patient whose heart has stopped or who has stopped breathing. If you plan to have surgery, talk with your health care provider about how your DNR or DNAR order will be followed if problems occur.  What if I do not have an advance directive?  Some states assign family decision makers to act on your behalf if you do not have an advance directive. Each state has its own laws about advance directives. You may want to check with your health care provider, , or state representative about the laws in your state.  Summary  Advance directives are legal documents that allow you to make decisions about your health care and medical treatment in case you become unable to communicate for yourself.  The process of discussing and writing advance directives should happen over time. You can change and update advance directives at any time.  Advance directives may include a medical power of , a living will, and a DNR or DNAR order.  This information is not intended to replace advice given to you by your health care provider.  Make sure you discuss any questions you have with your health care provider.  Document Revised: 09/21/2021 Document Reviewed: 09/21/2021  [employer]vier Patient Education © 2023 [employer]

## 2024-03-18 ENCOUNTER — TELEMEDICINE (OUTPATIENT)
Age: 49
End: 2024-03-18
Payer: COMMERCIAL

## 2024-03-18 DIAGNOSIS — F41.9 ANXIETY: Primary | ICD-10-CM

## 2024-03-18 NOTE — PATIENT INSTRUCTIONS
"Cognitive Distortions      Cognitive distortions are irrational thoughts that shape how you see the world, how you feel, and how you   act. It’s normal to have these thoughts occasionally, but they can be harmful when frequent or extreme.     Magnification and minimization: Exaggerating or minimizing the importance of events. You might   believe your own achievements are unimportant or that your mistakes are excessively important.    Catastrophizing: Seeing only the worst possible outcomes of a situation.    Overgeneralization: Making broad interpretations from a single or few events. “I felt awkward during   my job interview. I am always so awkward.”    Magical thinking: The belief that thoughts, actions, or emotions influence unrelated situations. \"If I   hadn't hoped something bad would happen to him, he wouldn't have gotten into an accident.\"    Personalization: The belief that you are responsible for events outside of your control. “My mom is   always upset. She would be fine if I did more to help her.”    Jumping to conclusions: Interpreting the meaning of a situation with little or no evidence.    Mind reading: Interpreting the thoughts and beliefs of others without adequate evidence. “She   wouldn’t go on a date with me. She probably thinks I’m ugly.”    Fortune telling: The expectation that a situation will turn out badly without adequate evidence.    Emotional reasoning: The assumption that emotions reflect the way things really are. “I feel like a bad   friend, therefore I must be a bad friend.”    Disqualifying the positive: Recognizing only the negative aspects of a situation while ignoring the   positive. You might receive many compliments on an evaluation, but focus on the single piece of   negative feedback.    “Should” statements: The belief that things should be a certain way. “I should always be perfect.”    All-or-nothing thinking: Thinking in absolutes such as “always,” “never,” or “every.” “I " never do a good   enough job on anything.    Provided by [email or web address] © 2023 [employer]

## 2024-03-18 NOTE — PROGRESS NOTES
Telehealth  (Video) Visit      Date: 2024   Patient Name: Yasmine Berry  : 1975   MRN: [id number]     Time In: 14:56       Time Out: 15:49     Referring Physician: La Charles PA-C    Chief Complaint:      ICD-10-CM ICD-9-CM   1. Anxiety  F41.9 300.00        This provider is located at [address], [address] [place]. [address]. This visit is being done on behalf of [facility] using a Vid[employer] platform for a video visit in a secure and private environment. The Patient is seen remotely at their home address in [place], using a private computer, phone or tablet. The patient is able to be seen through a MyChart Video Visit through [employer] at today's encounter. Patient is being evaluated/treated via telehealth by Zoom, and stated they are in a secure environment for this session. The patient's condition being diagnosed/treated is appropriate for telemedicine. The provider identified herself as well as her credentials. The patient, and/or patient's guardian, consent to being seen remotely, and when consent is given, they understand that the consent allows for patient identifiable information to be sent to a third party as needed. They may refuse to be seen remotely at any time. The electronic data is encrypted and password protected, and the patient and/or guardian has been advised of the potential risks to privacy not withstanding such measures.    You have chosen to receive care through a video visit today. Do you consent to use a video visit for your medical care today? Yes  This visit has been scheduled as a video visit to comply with patient safety concerns in accordance with CDC recommendations.    History of Present Illness: Yasmine Berry is a 48 y.o. female who I saw today thru a video visit for anxiety, as evidenced by concentration difficulties, feeling nervous or on edge, uncontrolled worry, increased irritability, and primary care report  "of insomnia.  Patient scored a 14 on the SHANIQUA-7, which indicated moderate anxiety.  Patient reported having family dynamics that have recently increased her stressors and anxiety.  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        Subjective      Review of Systems:   The following portions of the patient's history were reviewed and updated as appropriate: allergies, current medications, past family history, past medical history, past social history, past surgical history and problem list.     Interval History:   No Change    Patient's Support Network Includes:  , children, and sister    Functional Status: Mild impairment     Medications:     Current Outpatient Medications:     busPIRone (BUSPAR) 5 MG tablet, Take 1 tablet by mouth 2 (Two) Times a Day., Disp: 60 tablet, Rfl: 2    Cyanocobalamin 1000 MCG/ML kit, Inject 1 mL into the appropriate muscle as directed by prescriber 1 (One) Time Per Week. (Patient taking differently: Inject 1 mL into the appropriate muscle as directed by prescriber Every 30 (Thirty) Days.), Disp: 1 kit, Rfl: 2    Diclofenac Sodium (VOLTAREN) 1 % gel gel, Apply 4 g topically to the appropriate area as directed 3 (Three) Times a Day., Disp: 50 g, Rfl: 1    escitalopram (LEXAPRO) 10 MG tablet, Take 1 tablet by mouth Daily., Disp: , Rfl:     losartan (COZAAR) 25 MG tablet, TAKE 1 TABLET BY MOUTH DAILY, Disp: 90 tablet, Rfl: 0    Syringe 25G X 1\" 3 ML misc, Use to inject vitamin b12 injection, Disp: 10 each, Rfl: 0    SUICIDE RISK ASSESSMENT/CSSRS  Does patient have thoughts of suicide? no  Does patient have a plan for suicide? no  Does patient have a current plan for suicide? no  History of violent behaviors towards others or property? Unknown   History of sexual aggression toward others: Unknown   Access to firearms or weapons: Unknown    Objective     Mental Status Exam:   MENTAL STATUS " EXAM   General Appearance:  Cleanly groomed and dressed and looks younger than stated age  Eye Contact:  Good eye contact  Attitude:  Cooperative and polite  Motor Activity:  Normal gait, posture  Muscle Strength:  Normal  Speech:  Normal rate, tone, volume  Language:  Spontaneous  Mood and affect:  Normal, pleasant, appropriate and other  Other Comment:  Tearful  Hopelessness:  Denies  Loneliness: Denies  Thought Process:  Logical and goal-directed  Associations/ Thought Content:  No delusions  Hallucinations:  None  Suicidal Ideations:  Not present  Homicidal Ideation:  Not present  Sensorium:  Alert and clear  Orientation:  Person, place, time and situation  Immediate Recall, Recent, and Remote Memory:  Intact  Attention Span/ Concentration:  Good  Fund of Knowledge:  Appropriate for age and educational level  Intellectual Functioning:  Average range  Insight:  Good  Judgement:  Good  Reliability:  Good  Impulse Control:  Good       Assessment / Plan      Assessment:   Diagnoses and all orders for this visit:    1. Anxiety (Primary)         Plan:    Progress toward goal: Not at goal    Prognosis: Good with ongoing treatment    Safety: No acute safety concerns.  Therapist will continue to monitor.    Risk Assessment: Risk of self-harm acutely is low. Risk of self-harm chronically is also low, but could be further elevated in the event of treatment noncompliance and/or AODA.    Treatment Plan/Goals: Continue supportive psychotherapy efforts and medications as indicated. Treatment and medication options discussed during today's visit. Patient acknowledged and verbally consented to continue with current treatment plan and was educated on the importance of compliance with treatment and follow-up appointments. Patient seems reasonably able to adhere to treatment plan.      Assisted Patient in processing above session content; acknowledged and normalized patient’s thoughts, feelings, and concerns.  Rationalized patient  thought process regarding effective communication strategies, self-kindness, coping mechanisms, and internalizing problems, and flexibility in the context of environmental chaos.  Patient was provided a worksheet on cognitive distortions and set a goal to increase her level of joyful activity from 0 days to 2 days per week.      Allowed Patient to freely discuss issues  without interruption or judgement with unconditional positive regard, active listening skills, and empathy. Therapist provided a safe, confidential environment to facilitate the development of a positive therapeutic relationship and encouraged open, honest communication. Assisted Patient in identifying risk factors which would indicate the need for higher level of care including thoughts to harm self or others and/or self-harming behavior and encouraged Patient to contact this office, call 911, or present to the nearest emergency room should any of these events occur. Discussed crisis intervention services and means to access. Patient adamantly and convincingly denies current suicidal or homicidal ideation or perceptual disturbance. Assisted Patient in processing session content; acknowledged and normalized Patient’s thoughts, feelings, and concerns by utilizing a person-centered approach in efforts to build appropriate rapport and a positive therapeutic relationship with open and honest communication.     Follow Up:   Return in about 2 weeks (around 4/1/2024) for Video visit, Therapy.        This is electronically signed by Shilpa Bradford LCSW  03/18/2024 14:54 EDT

## 2024-04-03 ENCOUNTER — TELEMEDICINE (OUTPATIENT)
Age: 49
End: 2024-04-03
Payer: COMMERCIAL

## 2024-04-03 DIAGNOSIS — F41.9 ANXIETY: Primary | ICD-10-CM

## 2024-04-03 NOTE — PROGRESS NOTES
Telehealth  (Video) Visit      Date: 2024   Patient Name: Yasmine Berry  : 1975   MRN: [id number]     Time In: 16:00       Time Out: 16:55     Referring Physician: La Charles PA-C    Chief Complaint:      ICD-10-CM ICD-9-CM   1. Anxiety  F41.9 300.00        This provider is located at [address], [address] [place]. [address]. This visit is being done on behalf of [facility] using a [employer] platform for a video visit in a secure and private environment. The Patient is seen remotely at their home address in [place], using a private computer, phone or tablet. The patient is able to be seen through a MyChart Video Visit through [employer] at today's encounter. Patient is being evaluated/treated via telehealth by Zoom, and stated they are in a secure environment for this session. The patient's condition being diagnosed/treated is appropriate for telemedicine. The provider identified herself as well as her credentials. The patient, and/or patient's guardian, consent to being seen remotely, and when consent is given, they understand that the consent allows for patient identifiable information to be sent to a third party as needed. They may refuse to be seen remotely at any time. The electronic data is encrypted and password protected, and the patient and/or guardian has been advised of the potential risks to privacy not withstanding such measures.    You have chosen to receive care through a video visit today. Do you consent to use a video visit for your medical care today? Yes  This visit has been scheduled as a video visit to comply with patient safety concerns in accordance with CDC recommendations.    History of Present Illness: Yasmine Berry is a 48 y.o. female who I saw today thru a video visit for follow up behavioral health care therapy session.  Patient reported ongoing anxiety and stress related to interactions with her mother and not feeling  "that discussion is productive and patient's feelings as validated.   Patient also reported having anxiety and stressors related to her school and work due to changes that occur and increased demands in each.        Subjective      Review of Systems:   The following portions of the patient's history were reviewed and updated as appropriate: allergies, current medications, past family history, past medical history, past social history, past surgical history and problem list.     Interval History:   No Change    Patient's Support Network Includes:   and children    Functional Status: Mild impairment     Medications:     Current Outpatient Medications:     busPIRone (BUSPAR) 5 MG tablet, Take 1 tablet by mouth 2 (Two) Times a Day., Disp: 60 tablet, Rfl: 2    Cyanocobalamin 1000 MCG/ML kit, Inject 1 mL into the appropriate muscle as directed by prescriber 1 (One) Time Per Week. (Patient taking differently: Inject 1 mL into the appropriate muscle as directed by prescriber Every 30 (Thirty) Days.), Disp: 1 kit, Rfl: 2    Diclofenac Sodium (VOLTAREN) 1 % gel gel, Apply 4 g topically to the appropriate area as directed 3 (Three) Times a Day., Disp: 50 g, Rfl: 1    escitalopram (LEXAPRO) 10 MG tablet, Take 1 tablet by mouth Daily., Disp: , Rfl:     losartan (COZAAR) 25 MG tablet, TAKE 1 TABLET BY MOUTH DAILY, Disp: 90 tablet, Rfl: 0    Syringe 25G X 1\" 3 ML misc, Use to inject vitamin b12 injection, Disp: 10 each, Rfl: 0    SUICIDE RISK ASSESSMENT/CSSRS  Does patient have thoughts of suicide? no  Does patient have a current plan for suicide? no    Objective     Mental Status Exam:   MENTAL STATUS EXAM   General Appearance:  Cleanly groomed and dressed  Eye Contact:  Good eye contact  Attitude:  Cooperative and polite  Motor Activity:  Normal gait, posture  Muscle Strength:  Normal  Speech:  Normal rate, tone, volume  Language:  Spontaneous  Mood and affect:  Normal, pleasant and other  Other Comment:  Appropriately " tearful  Hopelessness:  Denies  Loneliness: Denies  Thought Process:  Logical, goal-directed and linear  Associations/ Thought Content:  No delusions  Hallucinations:  None  Suicidal Ideations:  Not present  Homicidal Ideation:  Not present  Sensorium:  Alert and clear  Orientation:  Person, place, time and situation  Immediate Recall, Recent, and Remote Memory:  Intact  Attention Span/ Concentration:  Good  Fund of Knowledge:  Appropriate for age and educational level  Intellectual Functioning:  Average range  Insight:  Good  Judgement:  Good  Reliability:  Good  Impulse Control:  Good and fair       Assessment / Plan      Assessment:   Diagnoses and all orders for this visit:    1. Anxiety (Primary)         Plan:    Progress toward goal: Not at goal    Prognosis: Good with ongoing treatment    Safety: No acute safety concerns.  This is patient's first session.  Therapist will continue to monitor.   Risk Assessment: Risk of self-harm acutely is low. Risk of self-harm chronically is also low, but could be further elevated in the event of treatment noncompliance and/or AODA.    Treatment Plan/Goals: Continue supportive psychotherapy efforts and medications as indicated. Treatment and medication options discussed during today's visit. Patient acknowledged and verbally consented to continue with current treatment plan and was educated on the importance of compliance with treatment and follow-up appointments. Patient seems reasonably able to adhere to treatment plan.      Assisted Patient in processing above session content; acknowledged and normalized patient’s thoughts, feelings, and concerns.  Rationalized patient thought process regarding irrational thoughts that create symptoms of anxiety and ways of managing stress, while increasing self care.  Patient was provided cognitive distortions at initial visit on AVS, but she reported could not access.  Epic team will be contacted to inquire.  Therapist reviewed some  common cognitive distortions and how they are related to patient's experiences.  Patient also was provided psychoeducation on additional aspects of CBT to consider in reframing thoughts that may lead to changes in behavior.  Patient discussed her upbringing that can have an impact on current values, behaviors, and healthy coping skills.  Patient will continue to attempt reviewing information that was sent to her in goal setting and to practice CBT techniques in negative and anxious thought processes.  Patient's preference was to return for follow up session in 3 weeks.      Allowed Patient to freely discuss issues  without interruption or judgement with unconditional positive regard, active listening skills, and empathy. Therapist provided a safe, confidential environment to facilitate the development of a positive therapeutic relationship and encouraged open, honest communication. Assisted Patient in identifying risk factors which would indicate the need for higher level of care including thoughts to harm self or others and/or self-harming behavior and encouraged Patient to contact this office, call 911, or present to the nearest emergency room should any of these events occur. Discussed crisis intervention services and means to access. Patient adamantly and convincingly denies current suicidal or homicidal ideation or perceptual disturbance. Assisted Patient in processing session content; acknowledged and normalized Patient’s thoughts, feelings, and concerns by utilizing a person-centered approach in efforts to build appropriate rapport and a positive therapeutic relationship with open and honest communication.     Follow Up:   Return in about 3 weeks (around 4/24/2024) for Video visit.        This is electronically signed by Shilpa Bradford LCSW  04/03/2024 16:57 EDT

## 2024-04-24 ENCOUNTER — TELEPHONE (OUTPATIENT)
Age: 49
End: 2024-04-24
Payer: COMMERCIAL

## 2024-05-02 ENCOUNTER — TELEPHONE (OUTPATIENT)
Dept: CASE MANAGEMENT | Facility: OTHER | Age: 49
End: 2024-05-02
Payer: COMMERCIAL

## 2024-05-02 NOTE — TELEPHONE ENCOUNTER
RN-ACM made outreach to patient regarding hypertension program. No answer, left message.    F/u outreach scheduled.

## 2024-05-09 ENCOUNTER — PATIENT OUTREACH (OUTPATIENT)
Dept: CASE MANAGEMENT | Facility: OTHER | Age: 49
End: 2024-05-09
Payer: COMMERCIAL

## 2024-05-09 DIAGNOSIS — I10 PRIMARY HYPERTENSION: Primary | ICD-10-CM

## 2024-05-13 ENCOUNTER — OFFICE VISIT (OUTPATIENT)
Dept: INTERNAL MEDICINE | Facility: CLINIC | Age: 49
End: 2024-05-13
Payer: COMMERCIAL

## 2024-05-13 ENCOUNTER — LAB (OUTPATIENT)
Dept: LAB | Facility: HOSPITAL | Age: 49
End: 2024-05-13
Payer: COMMERCIAL

## 2024-05-13 VITALS
HEART RATE: 65 BPM | BODY MASS INDEX: 24.24 KG/M2 | SYSTOLIC BLOOD PRESSURE: 112 MMHG | TEMPERATURE: 98.7 F | HEIGHT: 64 IN | DIASTOLIC BLOOD PRESSURE: 74 MMHG | OXYGEN SATURATION: 98 % | WEIGHT: 142 LBS

## 2024-05-13 DIAGNOSIS — Z13.29 THYROID DISORDER SCREENING: ICD-10-CM

## 2024-05-13 DIAGNOSIS — Z13.220 LIPID SCREENING: ICD-10-CM

## 2024-05-13 DIAGNOSIS — Z11.59 ENCOUNTER FOR HEPATITIS C SCREENING TEST FOR LOW RISK PATIENT: ICD-10-CM

## 2024-05-13 DIAGNOSIS — Z13.21 ENCOUNTER FOR VITAMIN DEFICIENCY SCREENING: ICD-10-CM

## 2024-05-13 DIAGNOSIS — Z00.00 ROUTINE MEDICAL EXAM: Primary | ICD-10-CM

## 2024-05-13 DIAGNOSIS — E55.9 VITAMIN D DEFICIENCY: ICD-10-CM

## 2024-05-13 DIAGNOSIS — M79.645 CHRONIC PAIN OF LEFT THUMB: ICD-10-CM

## 2024-05-13 DIAGNOSIS — G89.29 CHRONIC PAIN OF LEFT THUMB: ICD-10-CM

## 2024-05-13 DIAGNOSIS — Z00.00 ROUTINE MEDICAL EXAM: ICD-10-CM

## 2024-05-13 LAB
25(OH)D3 SERPL-MCNC: 37 NG/ML (ref 30–100)
ALBUMIN SERPL-MCNC: 4.4 G/DL (ref 3.5–5.2)
ALBUMIN/GLOB SERPL: 2.1 G/DL
ALP SERPL-CCNC: 57 U/L (ref 39–117)
ALT SERPL W P-5'-P-CCNC: 11 U/L (ref 1–33)
ANION GAP SERPL CALCULATED.3IONS-SCNC: 9 MMOL/L (ref 5–15)
AST SERPL-CCNC: 13 U/L (ref 1–32)
BASOPHILS # BLD AUTO: 0.04 10*3/MM3 (ref 0–0.2)
BASOPHILS NFR BLD AUTO: 0.8 % (ref 0–1.5)
BILIRUB SERPL-MCNC: 0.3 MG/DL (ref 0–1.2)
BUN SERPL-MCNC: 7 MG/DL (ref 6–20)
BUN/CREAT SERPL: 8.1 (ref 7–25)
CALCIUM SPEC-SCNC: 9 MG/DL (ref 8.6–10.5)
CHLORIDE SERPL-SCNC: 106 MMOL/L (ref 98–107)
CHOLEST SERPL-MCNC: 188 MG/DL (ref 0–200)
CO2 SERPL-SCNC: 27 MMOL/L (ref 22–29)
CREAT SERPL-MCNC: 0.86 MG/DL (ref 0.57–1)
DEPRECATED RDW RBC AUTO: 40.5 FL (ref 37–54)
EGFRCR SERPLBLD CKD-EPI 2021: 83.5 ML/MIN/1.73
EOSINOPHIL # BLD AUTO: 0.03 10*3/MM3 (ref 0–0.4)
EOSINOPHIL NFR BLD AUTO: 0.6 % (ref 0.3–6.2)
ERYTHROCYTE [DISTWIDTH] IN BLOOD BY AUTOMATED COUNT: 12.3 % (ref 12.3–15.4)
GLOBULIN UR ELPH-MCNC: 2.1 GM/DL
GLUCOSE SERPL-MCNC: 76 MG/DL (ref 65–99)
HCT VFR BLD AUTO: 40 % (ref 34–46.6)
HCV AB SER QL: NORMAL
HDLC SERPL-MCNC: 72 MG/DL (ref 40–60)
HGB BLD-MCNC: 12.9 G/DL (ref 12–15.9)
IMM GRANULOCYTES # BLD AUTO: 0.02 10*3/MM3 (ref 0–0.05)
IMM GRANULOCYTES NFR BLD AUTO: 0.4 % (ref 0–0.5)
LDLC SERPL CALC-MCNC: 104 MG/DL (ref 0–100)
LDLC/HDLC SERPL: 1.43 {RATIO}
LYMPHOCYTES # BLD AUTO: 1.5 10*3/MM3 (ref 0.7–3.1)
LYMPHOCYTES NFR BLD AUTO: 30.1 % (ref 19.6–45.3)
MCH RBC QN AUTO: 29.4 PG (ref 26.6–33)
MCHC RBC AUTO-ENTMCNC: 32.3 G/DL (ref 31.5–35.7)
MCV RBC AUTO: 91.1 FL (ref 79–97)
MONOCYTES # BLD AUTO: 0.47 10*3/MM3 (ref 0.1–0.9)
MONOCYTES NFR BLD AUTO: 9.4 % (ref 5–12)
NEUTROPHILS NFR BLD AUTO: 2.93 10*3/MM3 (ref 1.7–7)
NEUTROPHILS NFR BLD AUTO: 58.7 % (ref 42.7–76)
NRBC BLD AUTO-RTO: 0 /100 WBC (ref 0–0.2)
PLATELET # BLD AUTO: 208 10*3/MM3 (ref 140–450)
PMV BLD AUTO: 11.5 FL (ref 6–12)
POTASSIUM SERPL-SCNC: 4.3 MMOL/L (ref 3.5–5.2)
PROT SERPL-MCNC: 6.5 G/DL (ref 6–8.5)
RBC # BLD AUTO: 4.39 10*6/MM3 (ref 3.77–5.28)
SODIUM SERPL-SCNC: 142 MMOL/L (ref 136–145)
T3FREE SERPL-MCNC: 2.77 PG/ML (ref 2–4.4)
T4 FREE SERPL-MCNC: 0.93 NG/DL (ref 0.93–1.7)
TRIGL SERPL-MCNC: 64 MG/DL (ref 0–150)
TSH SERPL DL<=0.05 MIU/L-ACNC: 1.26 UIU/ML (ref 0.27–4.2)
VIT B12 BLD-MCNC: 495 PG/ML (ref 211–946)
VLDLC SERPL-MCNC: 12 MG/DL (ref 5–40)
WBC NRBC COR # BLD AUTO: 4.99 10*3/MM3 (ref 3.4–10.8)

## 2024-05-13 PROCEDURE — 84439 ASSAY OF FREE THYROXINE: CPT

## 2024-05-13 PROCEDURE — 82607 VITAMIN B-12: CPT

## 2024-05-13 PROCEDURE — 84481 FREE ASSAY (FT-3): CPT

## 2024-05-13 PROCEDURE — 86803 HEPATITIS C AB TEST: CPT

## 2024-05-13 PROCEDURE — 36415 COLL VENOUS BLD VENIPUNCTURE: CPT

## 2024-05-13 PROCEDURE — 99396 PREV VISIT EST AGE 40-64: CPT | Performed by: PHYSICIAN ASSISTANT

## 2024-05-13 PROCEDURE — 80061 LIPID PANEL: CPT

## 2024-05-13 PROCEDURE — 82306 VITAMIN D 25 HYDROXY: CPT

## 2024-05-13 PROCEDURE — 80050 GENERAL HEALTH PANEL: CPT

## 2024-05-13 NOTE — PROGRESS NOTES
[facility] Medicine    Yasmine Berry  1975   [id number]      Patient Care Team:  La Charles PA-C as PCP - General (Physician Assistant)  Yani Ruano APRN as Nurse Practitioner (Obstetrics and Gynecology)    Chief Complaint::   Chief Complaint   Patient presents with    Annual Exam     Thinks she had pap 2 years ago        HPI  Yasmine Berry is a 48-year-old female date of birth 1975 who presents today for routine physical.  She is  with 2 grown children.  She has completed first year in [employer].  Satisfied with the job change.    Current on Pap and completed mammogram this morning.  Also completed Cologuard last year.  Had breast augmentation last year.  No voiced concerns.  She denies chest pain, shortness of breath, palpitations, or headaches.  She reports periods are regular.  Was seen earlier in the year for ongoing anxiety.  She has since started Lexapro 10 mg and reports this works well.  She has discontinued buspirone altogether.    Left thumb pain that has persisted for 3-4 months. She does not recall an event that started this.   Patient Active Problem List   Diagnosis    Dyslipidemia    Dizziness    Scoliosis    Primary insomnia    Irregular menses    Right elbow pain    Lipid screening    Encounter for vitamin deficiency screening    Anemia due to vitamin B12 deficiency    Routine medical exam    Right ankle injury    Lower respiratory tract infection due to COVID-19 virus    Viral upper respiratory tract infection    Anxiety    Vitamin D deficiency    Primary hypertension    Thyroid disorder screening    Encounter for hepatitis C screening test for low risk patient        Past Medical History:   Diagnosis Date    Anxiety     Hypertension     Plantar plate injury, right, initial encounter     Urinary tract infection 12/01/20    Vitamin D deficiency        Past Surgical History:   Procedure Laterality Date    BREAST AUGMENTATION Bilateral     ECHO -  CONVERTED  02/24/2021    EF 70%. Trace-Mild MR. RVSP- 27 mmHg    PLANTAR FASCIA SURGERY Right        Family History   Problem Relation Age of Onset    Hypertension Mother     Hyperlipidemia Mother     Transient ischemic attack Mother 60    Hypertension Father     Heart disease Father 50        stent placement    No Known Problems Sister     Asthma Brother     Hypertension Maternal Grandmother     Diabetes Paternal Grandmother     Hypertension Paternal Grandmother     Hyperlipidemia Paternal Grandmother     Heart attack Paternal Grandfather     No Known Problems Son     No Known Problems Daughter        Social History     Socioeconomic History    Marital status:    Tobacco Use    Smoking status: Never    Smokeless tobacco: Never   Vaping Use    Vaping status: Never Used   Substance and Sexual Activity    Alcohol use: Yes     Comment: occasionally    Drug use: Never    Sexual activity: Yes     Partners: Male     Birth control/protection: Surgical     Comment: vasectomy       No Known Allergies    Review of Systems   Constitutional:  Negative for activity change, appetite change, diaphoresis, fatigue, unexpected weight gain and unexpected weight loss.   HENT:  Negative for hearing loss.    Eyes:  Negative for visual disturbance.   Respiratory:  Negative for chest tightness and shortness of breath.    Cardiovascular:  Negative for chest pain, palpitations and leg swelling.   Gastrointestinal:  Negative for abdominal pain, blood in stool, GERD and indigestion.   Endocrine: Negative for cold intolerance and heat intolerance.   Genitourinary:  Negative for dysuria and hematuria.   Musculoskeletal:  Positive for arthralgias. Negative for myalgias.   Skin:  Negative for skin lesions.   Neurological:  Negative for tremors, seizures, syncope, speech difficulty, weakness, headache, memory problem and confusion.   Hematological:  Does not bruise/bleed easily.   Psychiatric/Behavioral:  Negative for sleep disturbance and  "depressed mood. The patient is nervous/anxious.         Vital Signs  Vitals:    05/13/24 0847   BP: 112/74   BP Location: Left arm   Patient Position: Sitting   Cuff Size: Adult   Pulse: 65   Temp: 98.7 °F (37.1 °C)   TempSrc: Infrared   SpO2: 98%   Weight: 64.4 kg (142 lb)   Height: 162.6 cm (64.02\")   PainSc: 0-No pain     Body mass index is 24.36 kg/m².  BMI is within normal parameters. No other follow-up for BMI required.     Advance Care Planning   ACP discussion was held with the patient during this visit. Patient does not have an advance directive, information provided.       Current Outpatient Medications:     Cyanocobalamin 1000 MCG/ML kit, Inject 1 mL into the appropriate muscle as directed by prescriber 1 (One) Time Per Week. (Patient taking differently: Inject 1 mL into the appropriate muscle as directed by prescriber Every 30 (Thirty) Days.), Disp: 1 kit, Rfl: 2    escitalopram (LEXAPRO) 10 MG tablet, Take 1 tablet by mouth Daily., Disp: , Rfl:     Syringe 25G X 1\" 3 ML misc, Use to inject vitamin b12 injection, Disp: 10 each, Rfl: 0    Physical Exam  Vitals reviewed.   Constitutional:       Appearance: Normal appearance. She is well-developed.   HENT:      Head: Normocephalic and atraumatic.      Right Ear: Hearing, tympanic membrane, ear canal and external ear normal.      Left Ear: Hearing, tympanic membrane, ear canal and external ear normal.      Nose: Nose normal.      Mouth/Throat:      Pharynx: Uvula midline.   Eyes:      General: Lids are normal.      Conjunctiva/sclera: Conjunctivae normal.      Pupils: Pupils are equal, round, and reactive to light.   Cardiovascular:      Rate and Rhythm: Normal rate and regular rhythm.      Heart sounds: Normal heart sounds.   Pulmonary:      Effort: Pulmonary effort is normal.      Breath sounds: Normal breath sounds.   Abdominal:      General: Bowel sounds are normal.      Palpations: Abdomen is soft.   Musculoskeletal:         General: Tenderness present. " Normal range of motion.      Left hand: Tenderness present. No bony tenderness.      Cervical back: Full passive range of motion without pain, normal range of motion and neck supple.      Comments: Base of left thumb   Skin:     General: Skin is warm and dry.   Neurological:      Mental Status: She is alert and oriented to person, place, and time.      Deep Tendon Reflexes: Reflexes are normal and symmetric.   Psychiatric:         Speech: Speech normal.         Behavior: Behavior normal.         Thought Content: Thought content normal.         Judgment: Judgment normal.          ACE III MINI            Results Review:    No results found for this or any previous visit (from the past 672 hour(s)).  Procedures    Medication Review: Medications reviewed and noted    Social History     Socioeconomic History    Marital status:    Tobacco Use    Smoking status: Never    Smokeless tobacco: Never   Vaping Use    Vaping status: Never Used   Substance and Sexual Activity    Alcohol use: Yes     Comment: occasionally    Drug use: Never    Sexual activity: Yes     Partners: Male     Birth control/protection: Surgical     Comment: vasectomy        Assessment/Plan:    Diagnoses and all orders for this visit:    1. Routine medical exam (Primary)  Overview:  She is fully vaccinated to COVID.  Discussed labs, questions answered.    She is current on age related screenings.  Continue regular cardiovascular exercise routine.  Continue low fat, low cholesterol diet.      Orders:  -     CBC & Differential; Future  -     Comprehensive Metabolic Panel; Future    2. Encounter for vitamin deficiency screening  -     Vitamin B12; Future    3. Lipid screening  -     Lipid Panel; Future    4. Vitamin D deficiency  -     Vitamin D,25-Hydroxy; Future    5. Thyroid disorder screening  -     TSH; Future  -     T4, Free; Future  -     T3, Free; Future    6. Encounter for hepatitis C screening test for low risk patient  -     Hepatitis C  "antibody; Future    7. Chronic pain of left thumb  -     Ambulatory Referral to Hand Surgery         Patient Instructions   BMI for Adults  Body mass index (BMI) is a number found using a person's weight and height. BMI can help tell how much of a person's weight is made up of fat. BMI does not measure body fat directly. It is used instead of tests that directly measure body fat, which can be difficult and expensive.  What are BMI measurements used for?  BMI is useful to:  Find out if your weight puts you at higher risk for medical problems.  Help recommend changes, such as in diet and exercise. This can help you reach a healthy weight. BMI screening can be done again to see if these changes are working.  How is BMI calculated?  Your height and weight are measured. The BMI is found from those numbers. This can be done with U[place] or metric measurements. Note that charts and online BMI calculators are available to help you find your BMI quickly and easily without doing these calculations.  To calculate your BMI in [place] measurements:  Measure your weight in pounds (lb).  Multiply the number of pounds by 703.  So, for an adult who weighs 150 lb, multiply that number by 703: 150 x 703, which equals 105,450.  Measure your height in inches. Then multiply that number by itself to get a measurement called \"inches squared.\"  So, for an adult who is 70 inches tall, the \"inches squared\" measurement is 70 inches x 70 inches, which equals 4,900 inches squared.  Divide the total from step 2 (number of lb x 703) by the total from step 3 (inches squared): 105,450 ÷ 4,900 = 21.5. This is your BMI.  To calculate your BMI in metric measurements:    Measure your weight in kilograms (kg).  For this example, the weight is 70 kg.  Measure your height in meters (m). Then multiply that number by itself to get a measurement called \"meters squared.\"  So, for an adult who is 1.75 m tall, the \"meters squared\" measurement is 1.75 m x 1.75 m, which " equals 3.1 meters squared.  Divide the number of kilograms (your weight) by the meters squared number. In this example: 70 ÷ 3.1 = 22.6. This is your BMI.  What do the results mean?  BMI charts are used to see if you are underweight, normal weight, overweight, or obese. The following guidelines will be used:  Underweight: BMI less than 18.5.  Normal weight: BMI between 18.5 and 24.9.  Overweight: BMI between 25 and 29.9.  Obese: BMI of 30 or above.  BMI is a tool and cannot diagnose a condition. Talk with your health care provider about what your BMI means for you. Keep these notes in mind:  Weight includes fat and muscle. Someone with a muscular build, such as an athlete, may have a BMI that is higher than 24.9. In cases like these, BMI is not a correct measure of body fat.  If you have a BMI of 25 or higher, your provider may need to do more testing to find out if excess body fat is the cause.  BMI is measured the same way for males and females. Females usually have more body fat than males of the same height and weight.  Where to find more information  For more information about BMI, including tools to quickly find your BMI, go to:  Centers for Disease Control and Prevention: cdc.gov  American Heart Association: heart.org  National Heart, Lung, and Blood [place]: nhlbi.nih.gov  This information is not intended to replace advice given to you by your health care provider. Make sure you discuss any questions you have with your health care provider.  Document Revised: 09/07/2023 Document Reviewed: 08/31/2023  Elsevier Patient Education © 2024 [employer] Inc.  Advance Directive    Advance directives are legal documents that allow you to make decisions about your health care and medical treatment in case you become unable to communicate for yourself. Advance directives let your wishes be known to family, friends, and health care providers.  Discussing and writing advance directives should happen over time rather than  all at once. Advance directives can be changed and updated at any time. There are different types of advance directives, such as:  Medical power of .  Living will.  Do not resuscitate (DNR) order or do not attempt resuscitation (DNAR) order.  Health care proxy and medical power of   A health care proxy is also called a health care agent. This person is appointed to make medical decisions for you when you are unable to make decisions for yourself. Generally, people ask a trusted friend or family member to act as their proxy and represent their preferences. Make sure you have an agreement with your trusted person to act as your proxy. A proxy may have to make a medical decision on your behalf if your wishes are not known.  A medical power of , also called a durable power of  for health care, is a legal document that names your health care proxy. Depending on the laws in your state, the document may need to be:  Signed.  Notarized.  Dated.  Copied.  Witnessed.  Incorporated into your medical record.  You may also want to appoint a trusted person to manage your money in the event you are unable to do so. This is called a durable power of  for finances. It is a separate legal document from the durable power of  for health care. You may choose your health care proxy or someone different to act as your agent in money matters.  If you do not appoint a proxy, or there is a concern that the proxy is not acting in your best interest, a court may appoint a guardian to act on your behalf.  Living will  A living will is a set of instructions that state your wishes about medical care when you cannot express them yourself. Health care providers should keep a copy of your living will in your medical record. You may want to give a copy to family members or friends. To alert caregivers in case of an emergency, you can place a card in your wallet to let them know that you have a living  will and where they can find it. A living will is used if you become:  Terminally ill.  Disabled.  Unable to communicate or make decisions.  The following decisions should be included in your living will:  To use or not to use life support equipment, such as dialysis machines and breathing machines (ventilators).  Whether you want a DNR or DNAR order. This tells health care providers not to use cardiopulmonary resuscitation (CPR) if breathing or heartbeat stops.  To use or not to use tube feeding.  To be given or not to be given food and fluids.  Whether you want comfort (palliative) care when the goal becomes comfort rather than a cure.  Whether you want to donate your organs and tissues.  A living will does not give instructions for distributing your money and property if you should pass away.  DNR or DNAR  A DNR or DNAR order is a request not to have CPR in the event that your heart stops beating or you stop breathing. If a DNR or DNAR order has not been made and shared, a health care provider will try to help any patient whose heart has stopped or who has stopped breathing. If you plan to have surgery, talk with your health care provider about how your DNR or DNAR order will be followed if problems occur.  What if I do not have an advance directive?  Some states assign family decision makers to act on your behalf if you do not have an advance directive. Each state has its own laws about advance directives. You may want to check with your health care provider, , or state representative about the laws in your state.  Summary  Advance directives are legal documents that allow you to make decisions about your health care and medical treatment in case you become unable to communicate for yourself.  The process of discussing and writing advance directives should happen over time. You can change and update advance directives at any time.  Advance directives may include a medical power of , a living  will, and a DNR or DNAR order.  This information is not intended to replace advice given to you by your health care provider. Make sure you discuss any questions you have with your health care provider.  Document Revised: 09/21/2021 Document Reviewed: 09/21/2021  [employer] Patient Education © 2024 [employer]       Plan of care reviewed with patient at the conclusion of today's visit. Education was provided regarding diagnosis, management, and any prescribed or recommended OTC medications.Patient verbalizes understanding of and agreement with management plan.         I have seen Yasmine on this date of service:preparing for the visit, reviewing tests, obtaining and/or reviewing a separately obtained history, performing a medically appropriate examination and/or evaluation , counseling and educating the patient/family/caregiver, ordering medications, tests, or procedures, referring and communicating with other health care professionals , and documenting information in the medical record    [place] PA-C      Note: Part of this note may be an electronic transcription/translation of spoken language to printed text using the Dragon Dictation system.

## 2024-05-13 NOTE — PATIENT INSTRUCTIONS
"BMI for Adults  Body mass index (BMI) is a number found using a person's weight and height. BMI can help tell how much of a person's weight is made up of fat. BMI does not measure body fat directly. It is used instead of tests that directly measure body fat, which can be difficult and expensive.  What are BMI measurements used for?  BMI is useful to:  Find out if your weight puts you at higher risk for medical problems.  Help recommend changes, such as in diet and exercise. This can help you reach a healthy weight. BMI screening can be done again to see if these changes are working.  How is BMI calculated?  Your height and weight are measured. The BMI is found from those numbers. This can be done with U.S[place] or metric measurements. Note that charts and online BMI calculators are available to help you find your BMI quickly and easily without doing these calculations.  To calculate your BMI in U.S. measurements:  Measure your weight in pounds (lb).  Multiply the number of pounds by 703.  So, for an adult who weighs 150 lb, multiply that number by 703: 150 x 703, which equals 105,450.  Measure your height in inches. Then multiply that number by itself to get a measurement called \"inches squared.\"  So, for an adult who is 70 inches tall, the \"inches squared\" measurement is 70 inches x 70 inches, which equals 4,900 inches squared.  Divide the total from step 2 (number of lb x 703) by the total from step 3 (inches squared): 105,450 ÷ 4,900 = 21.5. This is your BMI.  To calculate your BMI in metric measurements:    Measure your weight in kilograms (kg).  For this example, the weight is 70 kg.  Measure your height in meters (m). Then multiply that number by itself to get a measurement called \"meters squared.\"  So, for an adult who is 1.75 m tall, the \"meters squared\" measurement is 1.75 m x 1.75 m, which equals 3.1 meters squared.  Divide the number of kilograms (your weight) by the meters squared number. In this example: 70 " ÷ 3.1 = 22.6. This is your BMI.  What do the results mean?  BMI charts are used to see if you are underweight, normal weight, overweight, or obese. The following guidelines will be used:  Underweight: BMI less than 18.5.  Normal weight: BMI between 18.5 and 24.9.  Overweight: BMI between 25 and 29.9.  Obese: BMI of 30 or above.  BMI is a tool and cannot diagnose a condition. Talk with your health care provider about what your BMI means for you. Keep these notes in mind:  Weight includes fat and muscle. Someone with a muscular build, such as an athlete, may have a BMI that is higher than 24.9. In cases like these, BMI is not a correct measure of body fat.  If you have a BMI of 25 or higher, your provider may need to do more testing to find out if excess body fat is the cause.  BMI is measured the same way for males and females. Females usually have more body fat than males of the same height and weight.  Where to find more information  For more information about BMI, including tools to quickly find your BMI, go to:  Centers for Disease Control and Prevention: cdc.gov  American Heart Association: heart.org  National Heart, Lung, and Blood [place]: nhlbi.nih.gov  This information is not intended to replace advice given to you by your health care provider. Make sure you discuss any questions you have with your health care provider.  Document Revised: 09/07/2023 Document Reviewed: 08/31/2023  [employer] Patient Education © 2024 [employer]  Advance Directive    Advance directives are legal documents that allow you to make decisions about your health care and medical treatment in case you become unable to communicate for yourself. Advance directives let your wishes be known to family, friends, and health care providers.  Discussing and writing advance directives should happen over time rather than all at once. Advance directives can be changed and updated at any time. There are different types of advance directives,  such as:  Medical power of .  Living will.  Do not resuscitate (DNR) order or do not attempt resuscitation (DNAR) order.  Health care proxy and medical power of   A health care proxy is also called a health care agent. This person is appointed to make medical decisions for you when you are unable to make decisions for yourself. Generally, people ask a trusted friend or family member to act as their proxy and represent their preferences. Make sure you have an agreement with your trusted person to act as your proxy. A proxy may have to make a medical decision on your behalf if your wishes are not known.  A medical power of , also called a durable power of  for health care, is a legal document that names your health care proxy. Depending on the laws in your state, the document may need to be:  Signed.  Notarized.  Dated.  Copied.  Witnessed.  Incorporated into your medical record.  You may also want to appoint a trusted person to manage your money in the event you are unable to do so. This is called a durable power of  for finances. It is a separate legal document from the durable power of  for health care. You may choose your health care proxy or someone different to act as your agent in money matters.  If you do not appoint a proxy, or there is a concern that the proxy is not acting in your best interest, a court may appoint a guardian to act on your behalf.  Living will  A living will is a set of instructions that state your wishes about medical care when you cannot express them yourself. Health care providers should keep a copy of your living will in your medical record. You may want to give a copy to family members or friends. To alert caregivers in case of an emergency, you can place a card in your wallet to let them know that you have a living will and where they can find it. A living will is used if you become:  Terminally ill.  Disabled.  Unable to communicate or  make decisions.  The following decisions should be included in your living will:  To use or not to use life support equipment, such as dialysis machines and breathing machines (ventilators).  Whether you want a DNR or DNAR order. This tells health care providers not to use cardiopulmonary resuscitation (CPR) if breathing or heartbeat stops.  To use or not to use tube feeding.  To be given or not to be given food and fluids.  Whether you want comfort (palliative) care when the goal becomes comfort rather than a cure.  Whether you want to donate your organs and tissues.  A living will does not give instructions for distributing your money and property if you should pass away.  DNR or DNAR  A DNR or DNAR order is a request not to have CPR in the event that your heart stops beating or you stop breathing. If a DNR or DNAR order has not been made and shared, a health care provider will try to help any patient whose heart has stopped or who has stopped breathing. If you plan to have surgery, talk with your health care provider about how your DNR or DNAR order will be followed if problems occur.  What if I do not have an advance directive?  Some states assign family decision makers to act on your behalf if you do not have an advance directive. Each state has its own laws about advance directives. You may want to check with your health care provider, , or state representative about the laws in your state.  Summary  Advance directives are legal documents that allow you to make decisions about your health care and medical treatment in case you become unable to communicate for yourself.  The process of discussing and writing advance directives should happen over time. You can change and update advance directives at any time.  Advance directives may include a medical power of , a living will, and a DNR or DNAR order.  This information is not intended to replace advice given to you by your health care provider.  Make sure you discuss any questions you have with your health care provider.  Document Revised: 09/21/2021 Document Reviewed: 09/21/2021  [employer] Patient Education © 2024 [employer]

## 2024-07-16 DIAGNOSIS — D51.8 OTHER VITAMIN B12 DEFICIENCY ANEMIA: ICD-10-CM

## 2024-07-16 NOTE — TELEPHONE ENCOUNTER
Rx Refill Note  Requested Prescriptions     Pending Prescriptions Disp Refills    Cyanocobalamin 1000 MCG/ML kit 1 kit 2     Sig: Inject 1 mL into the appropriate muscle as directed by prescriber 1 (One) Time Per Week.      Last office visit with prescribing clinician: 5/13/2024   Last telemedicine visit with prescribing clinician: Visit date not found   Next office visit with prescribing clinician: 5/19/2025                         Would you like a call back once the refill request has been completed: [] Yes [] No    If the office needs to give you a call back, can they leave a voicemail: [] Yes [] No    Marti Roberts RN  07/16/24, 10:53 EDT

## 2024-07-17 DIAGNOSIS — D51.8 OTHER VITAMIN B12 DEFICIENCY ANEMIA: ICD-10-CM

## 2024-07-17 NOTE — TELEPHONE ENCOUNTER
Pharmacy Name:  [facility]    Reference Number (if applicable): [id number]    Pharmacy representative name: [facility]    Pharmacy representative phone number: [phone]    What medication are you calling in regards to:     Cyanocobalamin 1000 MCG/ML kit       What question does the pharmacy have: NEEDS DIRECTION CLARIFICATION    Who is the provider that prescribed the medication: DR MCKEON    Additional notes: INSURANCE WON'T PAY IF PATIENT IS TAKING EVERY MONTH WITH 10M CG PER [facility]

## 2024-08-08 ENCOUNTER — TELEMEDICINE (OUTPATIENT)
Age: 49
End: 2024-08-08
Payer: COMMERCIAL

## 2024-08-08 DIAGNOSIS — F41.9 ANXIETY: Primary | ICD-10-CM

## 2024-08-08 PROCEDURE — 90834 PSYTX W PT 45 MINUTES: CPT

## 2024-08-08 NOTE — PATIENT INSTRUCTIONS
Chung Underwood,   It was nice to see you through telehealth today.  I hope you are able to access this information and review what we had discussed.  I have added the Cognitive Distortions worksheet, but also included Coping Skills for Anxiety as a way to offer some relaxation and mindfulness approaches toto help with anxiety.  There is also an introduction to Socratic questioning section that asks relevant information in challenging irrational thoughts.  Review and we will discuss next time!  I hope you have a good start to your semester!    Sincerely,  Shilpa

## 2024-08-08 NOTE — PROGRESS NOTES
Telehealth  (Video) Visit      Date: 2024   Patient Name: Yasmine Berry  : 1975   MRN: [id number]     Time In: 08:03       Time Out: 08:53     Referring Physician: La Charles PA-C    Chief Complaint:      ICD-10-CM ICD-9-CM   1. Anxiety  F41.9 300.00        This provider is located at [address], [address] [place]. [address]. This visit is being done on behalf of [facility] using a [employer] platform for a video visit in a secure and private environment. The Patient is seen remotely at their home address in [place], using a private computer, phone or tablet. The patient is able to be seen through a MyChart Video Visit through [employer] at today's encounter. Patient is being evaluated/treated via telehealth by Zoom, and stated they are in a secure environment for this session. The patient's condition being diagnosed/treated is appropriate for telemedicine. The provider identified herself as well as her credentials. The patient, and/or patient's guardian, consent to being seen remotely, and when consent is given, they understand that the consent allows for patient identifiable information to be sent to a third party as needed. They may refuse to be seen remotely at any time. The electronic data is encrypted and password protected, and the patient and/or guardian has been advised of the potential risks to privacy not withstanding such measures.    You have chosen to receive care through a video visit today. Do you consent to use a video visit for your medical care today? Yes  This visit has been scheduled as a video visit to comply with patient safety concerns in accordance with [facility] recommendations.    History of Present Illness: Yasmine Berry is a 49 y.o. female who I saw today thru a video visit for follow-up behavioral health therapy visit to continue to understand patient's mental health needs and to continue discussion about therapy goals and  "treatment plan in attempt to reduce symptoms of anxiety.  Patient was calm, pleasant, and tearful during therapy session today and was fully engaged in discussion.  Patient completed SHANIQUA-7 and PHQ-9 during session today which indicated minimal symptoms of depression and moderate symptoms of anxiety that can help form therapy goals.  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      Subjective      Review of Systems:   The following portions of the patient's history were reviewed and updated as appropriate: allergies, current medications, past family history, past medical history, past social history, past surgical history and problem list.     Interval History:   No Change    Patient's Support Network Includes:      Functional Status: Mild impairment     Medications:     Current Outpatient Medications:     Cyanocobalamin 1000 MCG/ML kit, Inject 1 mL into the appropriate muscle as directed by prescriber Every 30 (Thirty) Days., Disp: 1 mL, Rfl: 5    escitalopram (LEXAPRO) 10 MG tablet, Take 1 tablet by mouth Daily., Disp: , Rfl:     Syringe 25G X 1\" 3 ML misc, Use to inject vitamin b12 injection, Disp: 10 each, Rfl: 0    SUICIDE RISK ASSESSMENT/CSSRS  Does patient have thoughts of suicide? no  Does patient have a plan for suicide? no  Does patient have a current plan for suicide? no    Objective     Mental Status Exam:   MENTAL STATUS EXAM   General Appearance:  Cleanly groomed and dressed  Eye Contact:  Good eye contact  Attitude:  Cooperative and polite  Motor Activity:  Normal gait, posture  Muscle Strength:  Normal  Speech:  Normal rate, tone, volume  Language:  Spontaneous  Mood and affect:  Normal, pleasant, appropriate, anxious and other  Other Comment:  Tearful  Thought Process:  Logical, goal-directed and linear  Associations/ Thought Content:  No delusions  Hallucinations:  None  Suicidal Ideations:  Not present  Homicidal Ideation:  Not " "present  Sensorium:  Alert and clear  Orientation:  Person, place, time and situation  Immediate Recall, Recent, and Remote Memory:  Intact  Attention Span/ Concentration:  Good  Fund of Knowledge:  Appropriate for age and educational level  Intellectual Functioning:  Average range  Insight:  Good  Judgement:  Good  Reliability:  Good  Impulse Control:  Good       Assessment / Plan      Assessment:   Diagnoses and all orders for this visit:    1. Anxiety (Primary)         Plan:    Progress toward goal: Not at goal    Prognosis: Good with ongoing treatment    Safety: No acute safety concerns.  Therapist will continue to monitor.    Risk Assessment: Risk of self-harm acutely is low. Risk of self-harm chronically is also low, but could be further elevated in the event of treatment noncompliance and/or AODA.    Treatment Plan/Goals: Continue supportive psychotherapy efforts and medications as indicated. Treatment and medication options discussed during today's visit. Patient acknowledged and verbally consented to continue with current treatment plan and was educated on the importance of compliance with treatment and follow-up appointments. Patient seems reasonably able to adhere to treatment plan.      Assisted Patient in processing above session content; acknowledged and normalized patient’s thoughts, feelings, and concerns.  Rationalized patient thought process regarding distorted thinking that can contribute to negative thought processes and create unwanted behaviors.  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  Patient also experienced a negative clinical rotation over the summer with her school that also created circulating thoughts of \"why me?\".  Cognitive distortions were reviewed during this therapy session to connect her irrational thoughts with challenging and more positive thoughts.  Psychoeducational information will be " sent to patient to continue to consider cognitive distortions in thoughts that ruminate and how to challenge those thoughts.  Patient felt that continuing therapy goals of balance and self kindness were relevant and realistic.  Objectives continued as patient was asked to continue to find two joyful activities to consider during the week and to review psychoeducational information sent as a reflection and application of discussion today.    Allowed Patient to freely discuss issues  without interruption or judgement with unconditional positive regard, active listening skills, and empathy. Therapist provided a safe, confidential environment to facilitate the development of a positive therapeutic relationship and encouraged open, honest communication. Assisted Patient in identifying risk factors which would indicate the need for higher level of care including thoughts to harm self or others and/or self-harming behavior and encouraged Patient to contact this office, call 911, or present to the nearest emergency room should any of these events occur. Discussed crisis intervention services and means to access. Patient adamantly and convincingly denies current suicidal or homicidal ideation or perceptual disturbance. Assisted Patient in processing session content; acknowledged and normalized Patient’s thoughts, feelings, and concerns by utilizing a person-centered approach in efforts to build appropriate rapport and a positive therapeutic relationship with open and honest communication.     Follow Up:   No follow-ups on file. Patient will review her upcoming school schedule to determine availability.         This is electronically signed by Shilpa Bradford LCSW  08/08/2024 08:01 EDT

## 2024-10-10 ENCOUNTER — LAB (OUTPATIENT)
Dept: LAB | Facility: HOSPITAL | Age: 49
End: 2024-10-10
Payer: COMMERCIAL

## 2024-10-10 ENCOUNTER — OFFICE VISIT (OUTPATIENT)
Dept: INTERNAL MEDICINE | Facility: CLINIC | Age: 49
End: 2024-10-10
Payer: COMMERCIAL

## 2024-10-10 VITALS
WEIGHT: 146.2 LBS | HEIGHT: 64 IN | DIASTOLIC BLOOD PRESSURE: 82 MMHG | OXYGEN SATURATION: 96 % | TEMPERATURE: 98.4 F | BODY MASS INDEX: 24.96 KG/M2 | HEART RATE: 64 BPM | SYSTOLIC BLOOD PRESSURE: 116 MMHG

## 2024-10-10 DIAGNOSIS — F41.9 ANXIETY: Primary | ICD-10-CM

## 2024-10-10 DIAGNOSIS — E55.9 VITAMIN D DEFICIENCY: ICD-10-CM

## 2024-10-10 DIAGNOSIS — D51.8 OTHER VITAMIN B12 DEFICIENCY ANEMIA: ICD-10-CM

## 2024-10-10 DIAGNOSIS — F51.01 PRIMARY INSOMNIA: ICD-10-CM

## 2024-10-10 DIAGNOSIS — Z13.220 LIPID SCREENING: ICD-10-CM

## 2024-10-10 DIAGNOSIS — Z13.29 THYROID DISORDER SCREENING: ICD-10-CM

## 2024-10-10 DIAGNOSIS — R23.2 HOT FLASHES: ICD-10-CM

## 2024-10-10 PROBLEM — N95.1 HOT FLASHES DUE TO MENOPAUSE: Status: ACTIVE | Noted: 2024-10-10

## 2024-10-10 LAB
25(OH)D3 SERPL-MCNC: 38.8 NG/ML (ref 30–100)
ALBUMIN SERPL-MCNC: 4 G/DL (ref 3.5–5.2)
ALBUMIN/GLOB SERPL: 1.6 G/DL
ALP SERPL-CCNC: 55 U/L (ref 39–117)
ALT SERPL W P-5'-P-CCNC: 10 U/L (ref 1–33)
ANION GAP SERPL CALCULATED.3IONS-SCNC: 11.6 MMOL/L (ref 5–15)
AST SERPL-CCNC: 19 U/L (ref 1–32)
BASOPHILS # BLD AUTO: 0.05 10*3/MM3 (ref 0–0.2)
BASOPHILS NFR BLD AUTO: 0.8 % (ref 0–1.5)
BILIRUB SERPL-MCNC: 0.3 MG/DL (ref 0–1.2)
BUN SERPL-MCNC: 11 MG/DL (ref 6–20)
BUN/CREAT SERPL: 12.6 (ref 7–25)
CALCIUM SPEC-SCNC: 9 MG/DL (ref 8.6–10.5)
CHLORIDE SERPL-SCNC: 104 MMOL/L (ref 98–107)
CHOLEST SERPL-MCNC: 212 MG/DL (ref 0–200)
CO2 SERPL-SCNC: 26.4 MMOL/L (ref 22–29)
CREAT SERPL-MCNC: 0.87 MG/DL (ref 0.57–1)
DEPRECATED RDW RBC AUTO: 38.7 FL (ref 37–54)
EGFRCR SERPLBLD CKD-EPI 2021: 81.8 ML/MIN/1.73
EOSINOPHIL # BLD AUTO: 0.06 10*3/MM3 (ref 0–0.4)
EOSINOPHIL NFR BLD AUTO: 0.9 % (ref 0.3–6.2)
ERYTHROCYTE [DISTWIDTH] IN BLOOD BY AUTOMATED COUNT: 12 % (ref 12.3–15.4)
ESTRADIOL SERPL HS-MCNC: 69.1 PG/ML
FSH SERPL-ACNC: 2.92 MIU/ML
GLOBULIN UR ELPH-MCNC: 2.5 GM/DL
GLUCOSE SERPL-MCNC: 73 MG/DL (ref 65–99)
HCT VFR BLD AUTO: 40.2 % (ref 34–46.6)
HDLC SERPL-MCNC: 77 MG/DL (ref 40–60)
HGB BLD-MCNC: 13.1 G/DL (ref 12–15.9)
IMM GRANULOCYTES # BLD AUTO: 0.02 10*3/MM3 (ref 0–0.05)
IMM GRANULOCYTES NFR BLD AUTO: 0.3 % (ref 0–0.5)
LDLC SERPL CALC-MCNC: 123 MG/DL (ref 0–100)
LDLC/HDLC SERPL: 1.58 {RATIO}
LYMPHOCYTES # BLD AUTO: 1.66 10*3/MM3 (ref 0.7–3.1)
LYMPHOCYTES NFR BLD AUTO: 25.9 % (ref 19.6–45.3)
MCH RBC QN AUTO: 28.9 PG (ref 26.6–33)
MCHC RBC AUTO-ENTMCNC: 32.6 G/DL (ref 31.5–35.7)
MCV RBC AUTO: 88.7 FL (ref 79–97)
MONOCYTES # BLD AUTO: 0.59 10*3/MM3 (ref 0.1–0.9)
MONOCYTES NFR BLD AUTO: 9.2 % (ref 5–12)
NEUTROPHILS NFR BLD AUTO: 4.03 10*3/MM3 (ref 1.7–7)
NEUTROPHILS NFR BLD AUTO: 62.9 % (ref 42.7–76)
NRBC BLD AUTO-RTO: 0 /100 WBC (ref 0–0.2)
PLATELET # BLD AUTO: 206 10*3/MM3 (ref 140–450)
PMV BLD AUTO: 11.7 FL (ref 6–12)
POTASSIUM SERPL-SCNC: 4.1 MMOL/L (ref 3.5–5.2)
PROGEST SERPL-MCNC: 0.09 NG/ML
PROT SERPL-MCNC: 6.5 G/DL (ref 6–8.5)
RBC # BLD AUTO: 4.53 10*6/MM3 (ref 3.77–5.28)
SODIUM SERPL-SCNC: 142 MMOL/L (ref 136–145)
T3FREE SERPL-MCNC: 2.6 PG/ML (ref 2–4.4)
T4 FREE SERPL-MCNC: 1.06 NG/DL (ref 0.92–1.68)
TRIGL SERPL-MCNC: 66 MG/DL (ref 0–150)
TSH SERPL DL<=0.05 MIU/L-ACNC: 0.93 UIU/ML (ref 0.27–4.2)
VIT B12 BLD-MCNC: 706 PG/ML (ref 211–946)
VLDLC SERPL-MCNC: 12 MG/DL (ref 5–40)
WBC NRBC COR # BLD AUTO: 6.41 10*3/MM3 (ref 3.4–10.8)

## 2024-10-10 PROCEDURE — 99214 OFFICE O/P EST MOD 30 MIN: CPT | Performed by: PHYSICIAN ASSISTANT

## 2024-10-10 PROCEDURE — 82670 ASSAY OF TOTAL ESTRADIOL: CPT

## 2024-10-10 PROCEDURE — 84481 FREE ASSAY (FT-3): CPT

## 2024-10-10 PROCEDURE — 80050 GENERAL HEALTH PANEL: CPT

## 2024-10-10 PROCEDURE — 84144 ASSAY OF PROGESTERONE: CPT

## 2024-10-10 PROCEDURE — 82607 VITAMIN B-12: CPT

## 2024-10-10 PROCEDURE — 80061 LIPID PANEL: CPT

## 2024-10-10 PROCEDURE — 84439 ASSAY OF FREE THYROXINE: CPT

## 2024-10-10 PROCEDURE — 82306 VITAMIN D 25 HYDROXY: CPT

## 2024-10-10 PROCEDURE — 83001 ASSAY OF GONADOTROPIN (FSH): CPT

## 2024-10-10 NOTE — PROGRESS NOTES
"[facility] Internal Medicine    Yasmine Berry  1975   [id number]      Patient Care Team:  La Charles PA-C as PCP - General (Physician Assistant)  Yani Ruano APRN as Nurse Practitioner (Obstetrics and Gynecology)    Chief Complaint::   Chief Complaint   Patient presents with    medication follow-up     Lexapro         HPI  Yasmine Berry is a 49-year-old female date of birth 1975 who presents today for follow up.  She is  with 2 grown children.  She has completed first year in [employer] school and has one semester left.  Some stress from father since leaving family business.  Reports feeling \"wiped out\" with  headaches in the afternoon hot flashes.  Difficulty falling asleep and staying asleep.  Menses are somewhat regular, but she does not keep up with them.    Patient Active Problem List   Diagnosis    Dyslipidemia    Dizziness    Scoliosis    Primary insomnia    Irregular menses    Right elbow pain    Lipid screening    Encounter for vitamin deficiency screening    Anemia due to vitamin B12 deficiency    Routine medical exam    Right ankle injury    Lower respiratory tract infection due to COVID-19 virus    Viral upper respiratory tract infection    Anxiety    Vitamin D deficiency    Primary hypertension    Thyroid disorder screening    Encounter for hepatitis C screening test for low risk patient    Hot flashes due to menopause    Hot flashes        Past Medical History:   Diagnosis Date    Anxiety     Hypertension     Plantar plate injury, right, initial encounter     Urinary tract infection 12/01/20    Vitamin D deficiency        Past Surgical History:   Procedure Laterality Date    BREAST AUGMENTATION Bilateral     ECHO - CONVERTED  02/24/2021    EF 70%. Trace-Mild MREvelyn RVSP- 27 mmHg    PLANTAR FASCIA SURGERY Right        Family History   Problem Relation Age of Onset    Hypertension Mother     Hyperlipidemia Mother     Transient ischemic attack Mother 60    Hypertension Father  " "   Heart disease Father 50        stent placement    No Known Problems Sister     Asthma Brother     Hypertension Maternal Grandmother     Diabetes Paternal Grandmother     Hypertension Paternal Grandmother     Hyperlipidemia Paternal Grandmother     Heart attack Paternal Grandfather     No Known Problems Son     No Known Problems Daughter        Social History     Socioeconomic History    Marital status:    Tobacco Use    Smoking status: Never    Smokeless tobacco: Never   Vaping Use    Vaping status: Never Used   Substance and Sexual Activity    Alcohol use: Yes     Comment: occasionally    Drug use: Never    Sexual activity: Yes     Partners: Male     Birth control/protection: Surgical     Comment: vasectomy       No Known Allergies    Review of Systems   Constitutional:  Positive for unexpected weight gain.   Endocrine: Positive for heat intolerance.   Genitourinary:  Negative for menstrual problem.   Psychiatric/Behavioral:  Positive for sleep disturbance and stress.         Vital Signs  Vitals:    10/10/24 0929   BP: 116/82   BP Location: Left arm   Patient Position: Sitting   Cuff Size: Adult   Pulse: 64   Temp: 98.4 °F (36.9 °C)   TempSrc: Infrared   SpO2: 96%   Weight: 66.3 kg (146 lb 3.2 oz)   Height: 162.6 cm (64.02\")   PainSc: 0-No pain     Body mass index is 25.08 kg/m².  BMI is >= 25 and <30. (Overweight) The following options were offered after discussion;: weight loss educational material (shared in after visit summary), exercise counseling/recommendations, and nutrition counseling/recommendations     Advance Care Planning   ACP discussion was held with the patient during this visit. Patient does not have an advance directive, information provided.       Current Outpatient Medications:     Cyanocobalamin 1000 MCG/ML kit, Inject 1 mL into the appropriate muscle as directed by prescriber Every 30 (Thirty) Days., Disp: 1 mL, Rfl: 5    escitalopram (LEXAPRO) 10 MG tablet, Take 1 tablet by mouth " "Daily., Disp: , Rfl:     Syringe 25G X 1\" 3 ML misc, Use to inject vitamin b12 injection, Disp: 10 each, Rfl: 0    Physical Exam  Vitals reviewed.   Constitutional:       Appearance: Normal appearance. She is well-developed.   HENT:      Head: Normocephalic and atraumatic.      Right Ear: Hearing, tympanic membrane, ear canal and external ear normal.      Left Ear: Hearing, tympanic membrane, ear canal and external ear normal.      Nose: Nose normal.      Mouth/Throat:      Pharynx: Uvula midline.   Eyes:      General: Lids are normal.      Conjunctiva/sclera: Conjunctivae normal.      Pupils: Pupils are equal, round, and reactive to light.   Cardiovascular:      Rate and Rhythm: Normal rate and regular rhythm.      Heart sounds: Normal heart sounds.   Pulmonary:      Effort: Pulmonary effort is normal.      Breath sounds: Normal breath sounds.   Abdominal:      General: Bowel sounds are normal.      Palpations: Abdomen is soft.   Musculoskeletal:         General: Normal range of motion.      Cervical back: Full passive range of motion without pain, normal range of motion and neck supple.   Skin:     General: Skin is warm and dry.   Neurological:      Mental Status: She is alert and oriented to person, place, and time.      Deep Tendon Reflexes: Reflexes are normal and symmetric.   Psychiatric:         Speech: Speech normal.         Behavior: Behavior normal.         Thought Content: Thought content normal.         Judgment: Judgment normal.          ACE III MINI            Results Review:    No results found for this or any previous visit (from the past 672 hour(s)).  Procedures    Medication Review: Medications reviewed and noted    Social History     Socioeconomic History    Marital status:    Tobacco Use    Smoking status: Never    Smokeless tobacco: Never   Vaping Use    Vaping status: Never Used   Substance and Sexual Activity    Alcohol use: Yes     Comment: occasionally    Drug use: Never    Sexual " activity: Yes     Partners: Male     Birth control/protection: Surgical     Comment: vasectomy        Assessment/Plan:    Diagnoses and all orders for this visit:    1. Anxiety (Primary)  Overview:  Continue Lexapro 10 mg daily.      2. Hot flashes  Overview:  Labs today.  Likely perimenopausal.  Discussed HRT as an option.    Orders:  -     CBC & Differential; Future  -     Comprehensive Metabolic Panel; Future  -     Estradiol; Future  -     Follicle Stimulating Hormone; Future  -     Progesterone; Future    3. Other vitamin B12 deficiency anemia  Overview:  We will recheck B12 levels.  If under 500 restart weekly injections.    Orders:  -     Vitamin B12; Future  -     Vitamin D,25-Hydroxy; Future    4. Vitamin D deficiency    5. Thyroid disorder screening  -     TSH; Future  -     T4, Free; Future  -     T3, Free; Future    6. Lipid screening  -     Lipid Panel; Future    7. Primary insomnia  Overview:    Order for labs placed.  Will consider hormone regulation.           Plan of care reviewed with patient at the conclusion of today's visit. Education was provided regarding diagnosis, management, and any prescribed or recommended OTC medications.Patient verbalizes understanding of and agreement with management plan.        I have seen Yasmine on this date of service:preparing for the visit, reviewing tests, obtaining and/or reviewing a separately obtained history, performing a medically appropriate examination and/or evaluation , counseling and educating the patient/family/caregiver, ordering medications, tests, or procedures, referring and communicating with other health care professionals , and documenting information in the medical record    La Charles PA-C      Note: Part of this note may be an electronic transcription/translation of spoken language to printed text using the Dragon Dictation system.

## 2024-10-21 RX ORDER — PROGESTERONE 100 MG/1
CAPSULE ORAL
Qty: 60 CAPSULE | Refills: 2 | Status: SHIPPED | OUTPATIENT
Start: 2024-10-21

## 2024-10-21 RX ORDER — ESTRADIOL 0.03 MG/D
1 FILM, EXTENDED RELEASE TRANSDERMAL 2 TIMES WEEKLY
Qty: 8 PATCH | Refills: 3 | Status: SHIPPED | OUTPATIENT
Start: 2024-10-21

## 2024-12-12 ENCOUNTER — OFFICE VISIT (OUTPATIENT)
Dept: INTERNAL MEDICINE | Facility: CLINIC | Age: 49
End: 2024-12-12
Payer: COMMERCIAL

## 2024-12-12 ENCOUNTER — LAB (OUTPATIENT)
Dept: LAB | Facility: HOSPITAL | Age: 49
End: 2024-12-12
Payer: COMMERCIAL

## 2024-12-12 ENCOUNTER — HOSPITAL ENCOUNTER (OUTPATIENT)
Dept: GENERAL RADIOLOGY | Facility: HOSPITAL | Age: 49
Discharge: HOME | End: 2024-12-12
Payer: COMMERCIAL

## 2024-12-12 VITALS
WEIGHT: 144.4 LBS | DIASTOLIC BLOOD PRESSURE: 72 MMHG | OXYGEN SATURATION: 97 % | TEMPERATURE: 98 F | HEIGHT: 64 IN | BODY MASS INDEX: 24.65 KG/M2 | HEART RATE: 87 BPM | SYSTOLIC BLOOD PRESSURE: 118 MMHG

## 2024-12-12 DIAGNOSIS — F51.01 PRIMARY INSOMNIA: ICD-10-CM

## 2024-12-12 DIAGNOSIS — M79.671 RIGHT FOOT PAIN: ICD-10-CM

## 2024-12-12 DIAGNOSIS — N95.1 HOT FLASHES DUE TO MENOPAUSE: ICD-10-CM

## 2024-12-12 DIAGNOSIS — M79.671 RIGHT FOOT PAIN: Primary | ICD-10-CM

## 2024-12-12 LAB — ERYTHROCYTE [SEDIMENTATION RATE] IN BLOOD: 4 MM/HR (ref 0–20)

## 2024-12-12 PROCEDURE — 85652 RBC SED RATE AUTOMATED: CPT

## 2024-12-12 PROCEDURE — 84550 ASSAY OF BLOOD/URIC ACID: CPT

## 2024-12-12 PROCEDURE — 99214 OFFICE O/P EST MOD 30 MIN: CPT | Performed by: PHYSICIAN ASSISTANT

## 2024-12-12 PROCEDURE — 73630 X-RAY EXAM OF FOOT: CPT

## 2024-12-12 NOTE — PROGRESS NOTES
[facility] Internal Medicine    Yasmine Berry  1975   [id number]      Patient Care Team:  La Charles PA-C as PCP - General (Physician Assistant)  Yani Ruano APRN (Inactive) as Nurse Practitioner (Obstetrics and Gynecology)    Chief Complaint::   Chief Complaint   Patient presents with    Anxiety     Follow-up          HPI  History of Present Illness  Yasmine Berry is a 49-year-old female date of birth 1975 who presents today for evaluation of perimenopause and foot pain.    She reports a menstrual cycle that began shortly after her last visit, lasting for 22 days, followed by a 3-day break before resuming. She has been experiencing hot flashes at night, which have disrupted her sleep, often causing her to wake up around 2:00 AM or 4:00 AM. She expresses concern about the potential side effects of hormone therapy, particularly the risk of breast cancer. She acknowledges the possibility of anxiety contributing to her symptoms. She does not engage in regular exercise and does not wish to take any medications. She is currently on a regimen of one progesterone pill daily and has previously taken Loestrin.    She also reports soreness in her third toe, which she attempts to alleviate by elevating the metatarsal part. She had previously undergone surgery on this foot and has not experienced any issues since the procedure. The joint appears swollen, particularly towards the end of the day. She has not sustained any recent injuries to the toe but notes that it has been causing discomfort during walking for some time. The redness in the toe has been present intermittently for a couple of months and seems to worsen with increased use. This condition has been affecting her mobility, as she enjoys walking on her gravel driveway.    FAMILY HISTORY  She reports no family history of breast cancer. Her first cousin on her dad's side was diagnosed with ovarian cancer 2 years ago, but it was determined  to be from her mom's side.    MEDICATIONS  Current: progesterone, estrogen patch  Past: Loestrin         Patient Active Problem List   Diagnosis    Dyslipidemia    Dizziness    Scoliosis    Primary insomnia    Irregular menses    Right elbow pain    Lipid screening    Encounter for vitamin deficiency screening    Anemia due to vitamin B12 deficiency    Routine medical exam    Right ankle injury    Lower respiratory tract infection due to COVID-19 virus    Viral upper respiratory tract infection    Anxiety    Vitamin D deficiency    Primary hypertension    Thyroid disorder screening    Encounter for hepatitis C screening test for low risk patient    Hot flashes due to menopause    Hot flashes    Right foot pain        Past Medical History:   Diagnosis Date    Anxiety     Hypertension     Plantar plate injury, right, initial encounter     Urinary tract infection 12/01/20    Vitamin D deficiency        Past Surgical History:   Procedure Laterality Date    BREAST AUGMENTATION Bilateral     ECHO - CONVERTED  02/24/2021    EF 70%. Trace-Mild MR. RVSP- 27 mmHg    PLANTAR FASCIA SURGERY Right        Family History   Problem Relation Age of Onset    Hypertension Mother     Hyperlipidemia Mother     Transient ischemic attack Mother 60    Hypertension Father     Heart disease Father 50        stent placement    No Known Problems Sister     Asthma Brother     Hypertension Maternal Grandmother     Diabetes Paternal Grandmother     Hypertension Paternal Grandmother     Hyperlipidemia Paternal Grandmother     Heart attack Paternal Grandfather     No Known Problems Son     No Known Problems Daughter        Social History     Socioeconomic History    Marital status:    Tobacco Use    Smoking status: Never    Smokeless tobacco: Never   Vaping Use    Vaping status: Never Used   Substance and Sexual Activity    Alcohol use: Yes     Comment: occasionally    Drug use: Never    Sexual activity: Yes     Partners: Male     Birth  "control/protection: Surgical     Comment: vasectomy       No Known Allergies    Review of Systems   Constitutional:  Negative for chills, diaphoresis, fatigue and fever.   HENT:  Negative for congestion, sore throat and swollen glands.    Respiratory:  Negative for cough.    Cardiovascular:  Negative for chest pain.   Gastrointestinal:  Negative for abdominal pain, nausea and vomiting.   Endocrine: Positive for heat intolerance.   Genitourinary:  Negative for dysuria.   Musculoskeletal:  Negative for myalgias and neck pain.   Skin:  Negative for rash.   Neurological:  Negative for weakness and numbness.   Psychiatric/Behavioral:  Positive for sleep disturbance.           Vital Signs  Vitals:    12/12/24 1114   BP: 118/72   BP Location: Left arm   Patient Position: Sitting   Cuff Size: Adult   Pulse: 87   Temp: 98 °F (36.7 °C)   TempSrc: Temporal   SpO2: 97%   Weight: 65.5 kg (144 lb 6.4 oz)   Height: 162.6 cm (64.02\")   PainSc: 0-No pain     Body mass index is 24.77 kg/m².  BMI is within normal parameters. No other follow-up for BMI required.     Advance Care Planning   ACP discussion was held with the patient during this visit. Patient does not have an advance directive, information provided.       Current Outpatient Medications:     Cyanocobalamin 1000 MCG/ML kit, Inject 1 mL into the appropriate muscle as directed by prescriber Every 30 (Thirty) Days., Disp: 1 mL, Rfl: 5    escitalopram (LEXAPRO) 10 MG tablet, Take 1 tablet by mouth Daily., Disp: , Rfl:     estradiol (Vivelle-Dot) 0.025 MG/24HR patch, Place 1 patch on the skin as directed by provider 2 (Two) Times a Week., Disp: 8 patch, Rfl: 3    Progesterone (PROMETRIUM) 100 MG capsule, May take 1 to 2 capsules nightly, Disp: 60 capsule, Rfl: 2    Syringe 25G X 1\" 3 ML misc, Use to inject vitamin b12 injection, Disp: 10 each, Rfl: 0    Physical Exam  Vitals reviewed.   Constitutional:       Appearance: Normal appearance. She is well-developed.   HENT:      " Head: Normocephalic and atraumatic.      Right Ear: Hearing, tympanic membrane, ear canal and external ear normal.      Left Ear: Hearing, tympanic membrane, ear canal and external ear normal.      Nose: Nose normal.      Mouth/Throat:      Pharynx: Uvula midline.   Eyes:      General: Lids are normal.      Conjunctiva/sclera: Conjunctivae normal.      Pupils: Pupils are equal, round, and reactive to light.   Cardiovascular:      Rate and Rhythm: Normal rate and regular rhythm.      Heart sounds: Normal heart sounds.   Pulmonary:      Effort: Pulmonary effort is normal.      Breath sounds: Normal breath sounds.   Abdominal:      General: Bowel sounds are normal.      Palpations: Abdomen is soft.   Musculoskeletal:         General: Normal range of motion.      Cervical back: Full passive range of motion without pain, normal range of motion and neck supple.   Skin:     General: Skin is warm and dry.   Neurological:      Mental Status: She is alert and oriented to person, place, and time.      Deep Tendon Reflexes: Reflexes are normal and symmetric.   Psychiatric:         Speech: Speech normal.         Behavior: Behavior normal.         Thought Content: Thought content normal.         Judgment: Judgment normal.          ACE III MINI            Results Review:    No results found for this or any previous visit (from the past 4 weeks).  Procedures    Medication Review: Medications reviewed and noted    Social History     Socioeconomic History    Marital status:    Tobacco Use    Smoking status: Never    Smokeless tobacco: Never   Vaping Use    Vaping status: Never Used   Substance and Sexual Activity    Alcohol use: Yes     Comment: occasionally    Drug use: Never    Sexual activity: Yes     Partners: Male     Birth control/protection: Surgical     Comment: vasectomy        Assessment/Plan:    Diagnoses and all orders for this visit:    1. Right foot pain (Primary)  -     Sedimentation Rate; Future  -     Uric  Acid; Future  -     XR Foot 3+ View Right; Future    2. Hot flashes due to menopause  Overview:  Discussed with patient.  She can increase to 0.05 mg patches twice weekly.  Will try to stay on the lowest dose possible.      3. Primary insomnia  Overview:  May increase to 200 mg progesterone nightly.           Assessment & Plan  1. Perimenopause.  Her symptoms, including hot flashes, headaches, and difficulty falling asleep, are consistent with perimenopause. Laboratory results from 10/10/2023 indicate normal cholesterol, thyroid, blood sugar, kidney, liver enzymes, and B12 levels. Vitamin D was low normal, and estrogen levels were mid-range. FSH was normal, indicating she is not in menopause, and progesterone was low. She was advised to increase her progesterone intake to 200 mg and continue using the 0.025 mg estrogen patch. The potential risks and benefits of hormone replacement therapy were discussed, including the low risk of breast cancer given her family history. She was also encouraged to incorporate walking into her daily routine to help with sleep and overall well-being.    2. Foot pain.  She reports pain and redness in her third toe, which has been ongoing for a couple of months and affects her walking. An x-ray of the foot will be ordered to ensure proper alignment and rule out any new injuries. Additionally, a sed rate and uric acid test will be conducted to exclude other potential issues. Based on the results, a referral to a specialist may be made if necessary.    PROCEDURE  The patient had previously undergone surgery on her foot.         Plan of care reviewed with patient at the conclusion of today's visit. Education was provided regarding diagnosis, management, and any prescribed or recommended OTC medications.Patient verbalizes understanding of and agreement with management plan.       I have seen Yasmine on this date of service:preparing for the visit, reviewing tests, obtaining and/or reviewing a  separately obtained history, performing a medically appropriate examination and/or evaluation , counseling and educating the patient/family/caregiver, ordering medications, tests, or procedures, referring and communicating with other health care professionals , and documenting information in the medical record    La Charles PA-C    Patient or patient representative verbalized consent for the use of Ambient Listening during the visit with  La Charles PA-C for chart documentation. 12/12/2024  17:33 EST

## 2024-12-13 LAB — URATE SERPL-MCNC: 2.9 MG/DL (ref 2.4–5.7)

## 2024-12-17 DIAGNOSIS — M79.671 RIGHT FOOT PAIN: Primary | ICD-10-CM

## 2025-01-29 DIAGNOSIS — D51.8 OTHER VITAMIN B12 DEFICIENCY ANEMIA: ICD-10-CM

## 2025-01-29 NOTE — TELEPHONE ENCOUNTER
Caller: Yasmine Berry    Relationship: Self    Best call back number: [phone]     Requested Prescriptions:   Requested Prescriptions     Pending Prescriptions Disp Refills    estradiol (Vivelle-Dot) 0.025 MG/24HR patch 8 patch 3     Sig: Place 1 patch on the skin as directed by provider 2 (Two) Times a Week.    Progesterone (PROMETRIUM) 100 MG capsule 60 capsule 2     Sig: May take 1 to 2 capsules nightly    Cyanocobalamin 1000 MCG/ML kit 1 mL 5     Sig: Inject 1 mL into the appropriate muscle as directed by prescriber Every 30 (Thirty) Days.        Pharmacy where request should be sent: [facility] [address] [phone] [facility] [phone]      Last office visit with prescribing clinician: 12/12/2024   Last telemedicine visit with prescribing clinician: Visit date not found   Next office visit with prescribing clinician: 5/19/2025     Additional details provided by patient: PATIENT HAS CALLED REQUESTING ALL NEW PRESCRIPTIONS WITH REFILLS ON ABOVE MEDICATIONS TO GO TO HER NEW PHARMACY [facility]. PATIENT IS ALSO REQUESTING IF PRESCRIPTION FOR CYANOCOBALAMIN CAN BE INCREASED BY 10 SO SHE DOESN'T HAVE TO REFILL AS MUCH.     Does the patient have less than a 3 day supply:  [] Yes  [x] No    Would you like a call back once the refill request has been completed: [] Yes [x] No    If the office needs to give you a call back, can they leave a voicemail: [] Yes [x] No    Harris Booth   01/29/25 14:57 EST

## 2025-01-30 RX ORDER — PROGESTERONE 100 MG/1
CAPSULE ORAL
Qty: 60 CAPSULE | Refills: 2 | Status: SHIPPED | OUTPATIENT
Start: 2025-01-30

## 2025-01-30 RX ORDER — ESTRADIOL 0.03 MG/D
1 FILM, EXTENDED RELEASE TRANSDERMAL 2 TIMES WEEKLY
Qty: 8 PATCH | Refills: 3 | Status: SHIPPED | OUTPATIENT
Start: 2025-01-30

## 2025-02-04 RX ORDER — PROGESTERONE 100 MG/1
CAPSULE ORAL
Qty: 60 CAPSULE | Refills: 2 | Status: SHIPPED | OUTPATIENT
Start: 2025-02-04

## 2025-02-14 RX ORDER — ESCITALOPRAM OXALATE 10 MG/1
10 TABLET ORAL DAILY
Qty: 90 TABLET | Refills: 2 | Status: SHIPPED | OUTPATIENT
Start: 2025-02-14

## 2025-06-13 ENCOUNTER — LAB (OUTPATIENT)
Dept: LAB | Facility: HOSPITAL | Age: 50
End: 2025-06-13
Payer: COMMERCIAL

## 2025-06-13 ENCOUNTER — OFFICE VISIT (OUTPATIENT)
Dept: INTERNAL MEDICINE | Facility: CLINIC | Age: 50
End: 2025-06-13
Payer: COMMERCIAL

## 2025-06-13 VITALS
HEART RATE: 66 BPM | SYSTOLIC BLOOD PRESSURE: 126 MMHG | OXYGEN SATURATION: 100 % | DIASTOLIC BLOOD PRESSURE: 84 MMHG | BODY MASS INDEX: 24.92 KG/M2 | TEMPERATURE: 98.1 F | HEIGHT: 64 IN | WEIGHT: 146 LBS

## 2025-06-13 DIAGNOSIS — Z12.11 SCREENING FOR COLON CANCER: ICD-10-CM

## 2025-06-13 DIAGNOSIS — Z00.00 ROUTINE MEDICAL EXAM: Primary | ICD-10-CM

## 2025-06-13 DIAGNOSIS — Z13.1 DIABETES MELLITUS SCREENING: ICD-10-CM

## 2025-06-13 DIAGNOSIS — Z13.21 ENCOUNTER FOR VITAMIN DEFICIENCY SCREENING: ICD-10-CM

## 2025-06-13 DIAGNOSIS — I10 PRIMARY HYPERTENSION: ICD-10-CM

## 2025-06-13 DIAGNOSIS — Z12.31 ENCOUNTER FOR SCREENING MAMMOGRAM FOR MALIGNANT NEOPLASM OF BREAST: ICD-10-CM

## 2025-06-13 DIAGNOSIS — Z13.29 THYROID DISORDER SCREENING: ICD-10-CM

## 2025-06-13 DIAGNOSIS — Z13.220 LIPID SCREENING: ICD-10-CM

## 2025-06-13 DIAGNOSIS — N95.1 HOT FLASHES DUE TO MENOPAUSE: ICD-10-CM

## 2025-06-13 DIAGNOSIS — M79.671 RIGHT FOOT PAIN: ICD-10-CM

## 2025-06-13 LAB
25(OH)D3 SERPL-MCNC: 39.9 NG/ML (ref 30–100)
ALBUMIN SERPL-MCNC: 4.3 G/DL (ref 3.5–5.2)
ALBUMIN/GLOB SERPL: 1.5 G/DL
ALP SERPL-CCNC: 66 U/L (ref 39–117)
ALT SERPL W P-5'-P-CCNC: 12 U/L (ref 1–33)
ANION GAP SERPL CALCULATED.3IONS-SCNC: 10.7 MMOL/L (ref 5–15)
AST SERPL-CCNC: 19 U/L (ref 1–32)
BASOPHILS # BLD AUTO: 0.04 10*3/MM3 (ref 0–0.2)
BASOPHILS NFR BLD AUTO: 0.9 % (ref 0–1.5)
BILIRUB SERPL-MCNC: 0.4 MG/DL (ref 0–1.2)
BUN SERPL-MCNC: 8 MG/DL (ref 6–20)
BUN/CREAT SERPL: 9.8 (ref 7–25)
CALCIUM SPEC-SCNC: 9.2 MG/DL (ref 8.6–10.5)
CHLORIDE SERPL-SCNC: 102 MMOL/L (ref 98–107)
CHOLEST SERPL-MCNC: 198 MG/DL (ref 0–200)
CO2 SERPL-SCNC: 24.3 MMOL/L (ref 22–29)
CREAT SERPL-MCNC: 0.82 MG/DL (ref 0.57–1)
DEPRECATED RDW RBC AUTO: 40 FL (ref 37–54)
EGFRCR SERPLBLD CKD-EPI 2021: 87.3 ML/MIN/1.73
EOSINOPHIL # BLD AUTO: 0.04 10*3/MM3 (ref 0–0.4)
EOSINOPHIL NFR BLD AUTO: 0.9 % (ref 0.3–6.2)
ERYTHROCYTE [DISTWIDTH] IN BLOOD BY AUTOMATED COUNT: 12 % (ref 12.3–15.4)
GLOBULIN UR ELPH-MCNC: 2.8 GM/DL
GLUCOSE SERPL-MCNC: 79 MG/DL (ref 65–99)
HBA1C MFR BLD: 5.1 % (ref 4.8–5.6)
HCT VFR BLD AUTO: 41.1 % (ref 34–46.6)
HDLC SERPL-MCNC: 65 MG/DL (ref 40–60)
HGB BLD-MCNC: 13.6 G/DL (ref 12–15.9)
IMM GRANULOCYTES # BLD AUTO: 0.01 10*3/MM3 (ref 0–0.05)
IMM GRANULOCYTES NFR BLD AUTO: 0.2 % (ref 0–0.5)
LDLC SERPL CALC-MCNC: 122 MG/DL (ref 0–100)
LDLC/HDLC SERPL: 1.87 {RATIO}
LYMPHOCYTES # BLD AUTO: 1.76 10*3/MM3 (ref 0.7–3.1)
LYMPHOCYTES NFR BLD AUTO: 39.5 % (ref 19.6–45.3)
MCH RBC QN AUTO: 30.1 PG (ref 26.6–33)
MCHC RBC AUTO-ENTMCNC: 33.1 G/DL (ref 31.5–35.7)
MCV RBC AUTO: 90.9 FL (ref 79–97)
MONOCYTES # BLD AUTO: 0.37 10*3/MM3 (ref 0.1–0.9)
MONOCYTES NFR BLD AUTO: 8.3 % (ref 5–12)
NEUTROPHILS NFR BLD AUTO: 2.24 10*3/MM3 (ref 1.7–7)
NEUTROPHILS NFR BLD AUTO: 50.2 % (ref 42.7–76)
NRBC BLD AUTO-RTO: 0 /100 WBC (ref 0–0.2)
PLATELET # BLD AUTO: 255 10*3/MM3 (ref 140–450)
PMV BLD AUTO: 11.6 FL (ref 6–12)
POTASSIUM SERPL-SCNC: 3.9 MMOL/L (ref 3.5–5.2)
PROT SERPL-MCNC: 7.1 G/DL (ref 6–8.5)
RBC # BLD AUTO: 4.52 10*6/MM3 (ref 3.77–5.28)
SODIUM SERPL-SCNC: 137 MMOL/L (ref 136–145)
T3FREE SERPL-MCNC: 2.74 PG/ML (ref 2–4.4)
T4 FREE SERPL-MCNC: 1 NG/DL (ref 0.92–1.68)
TRIGL SERPL-MCNC: 57 MG/DL (ref 0–150)
TSH SERPL DL<=0.05 MIU/L-ACNC: 0.86 UIU/ML (ref 0.27–4.2)
VIT B12 BLD-MCNC: 636 PG/ML (ref 211–946)
VLDLC SERPL-MCNC: 11 MG/DL (ref 5–40)
WBC NRBC COR # BLD AUTO: 4.46 10*3/MM3 (ref 3.4–10.8)

## 2025-06-13 PROCEDURE — 83036 HEMOGLOBIN GLYCOSYLATED A1C: CPT

## 2025-06-13 PROCEDURE — 80050 GENERAL HEALTH PANEL: CPT

## 2025-06-13 PROCEDURE — 82607 VITAMIN B-12: CPT

## 2025-06-13 PROCEDURE — 84439 ASSAY OF FREE THYROXINE: CPT

## 2025-06-13 PROCEDURE — 84481 FREE ASSAY (FT-3): CPT

## 2025-06-13 PROCEDURE — 82306 VITAMIN D 25 HYDROXY: CPT

## 2025-06-13 PROCEDURE — 80061 LIPID PANEL: CPT

## 2025-06-13 RX ORDER — ESCITALOPRAM OXALATE 10 MG/1
10 TABLET ORAL DAILY
Qty: 90 TABLET | Refills: 2 | Status: SHIPPED | OUTPATIENT
Start: 2025-06-13

## 2025-06-13 RX ORDER — PROGESTERONE 200 MG/1
CAPSULE ORAL
Qty: 90 CAPSULE | Refills: 1 | Status: SHIPPED | OUTPATIENT
Start: 2025-06-13

## 2025-06-13 RX ORDER — ESTRADIOL 0.05 MG/D
1 PATCH, EXTENDED RELEASE TRANSDERMAL 2 TIMES WEEKLY
Qty: 8 PATCH | Refills: 5 | Status: SHIPPED | OUTPATIENT
Start: 2025-06-16

## 2025-06-13 NOTE — PATIENT INSTRUCTIONS
"BMI for Adults  Body mass index (BMI) is a number found using a person's weight and height. BMI can help tell how much of a person's weight is made up of fat. BMI does not measure body fat directly. It is used instead of tests that directly measure body fat, which can be difficult and expensive.  What are BMI measurements used for?  BMI is useful to:  Find out if your weight puts you at higher risk for medical problems.  Help recommend changes, such as in diet and exercise. This can help you reach a healthy weight. BMI screening can be done again to see if these changes are working.  How is BMI calculated?  Your height and weight are measured. The BMI is found from those numbers. This can be done with U.S[place] or metric measurements. Note that charts and online BMI calculators are available to help you find your BMI quickly and easily without doing these calculations.  To calculate your BMI in U.S. measurements:  Measure your weight in pounds (lb).  Multiply the number of pounds by 703.  So, for an adult who weighs 150 lb, multiply that number by 703: 150 x 703, which equals 105,450.  Measure your height in inches. Then multiply that number by itself to get a measurement called \"inches squared.\"  So, for an adult who is 70 inches tall, the \"inches squared\" measurement is 70 inches x 70 inches, which equals 4,900 inches squared.  Divide the total from step 2 (number of lb x 703) by the total from step 3 (inches squared): 105,450 ÷ 4,900 = 21.5. This is your BMI.  To calculate your BMI in metric measurements:    Measure your weight in kilograms (kg).  For this example, the weight is 70 kg.  Measure your height in meters (m). Then multiply that number by itself to get a measurement called \"meters squared.\"  So, for an adult who is 1.75 m tall, the \"meters squared\" measurement is 1.75 m x 1.75 m, which equals 3.1 meters squared.  Divide the number of kilograms (your weight) by the meters squared number. In this example: 70 " ÷ 3.1 = 22.6. This is your BMI.  What do the results mean?  BMI charts are used to see if you are underweight, normal weight, overweight, or obese. The following guidelines will be used:  Underweight: BMI less than 18.5.  Normal weight: BMI between 18.5 and 24.9.  Overweight: BMI between 25 and 29.9.  Obese: BMI of 30 or above.  BMI is a tool and cannot diagnose a condition. Talk with your health care provider about what your BMI means for you. Keep these notes in mind:  Weight includes fat and muscle. Someone with a muscular build, such as an athlete, may have a BMI that is higher than 24.9. In cases like these, BMI is not a correct measure of body fat.  If you have a BMI of 25 or higher, your provider may need to do more testing to find out if excess body fat is the cause.  BMI is measured the same way for males and females. Females usually have more body fat than males of the same height and weight.  Where to find more information  For more information about BMI, including tools to quickly find your BMI, go to:  Centers for Disease Control and Prevention: cdc.gov  American Heart Association: heart.org  National Heart, Lung, and Blood [place]: nhlbi.nih.gov  This information is not intended to replace advice given to you by your health care provider. Make sure you discuss any questions you have with your health care provider.  Document Revised: 09/07/2023 Document Reviewed: 08/31/2023  [employer] Patient Education © 2024 [employer]Advance Directive    Advance directives are legal papers that state your wishes about health care decisions. They let your wishes be known to family, friends, and health care providers if you become unable to speak for yourself.   You should write these papers out over time rather than all at once. They can be changed and updated at any time.  The types of advance directives include:  Medical power of  (POA).  Living shepard.  Do not resuscitate (DNR) or do not attempt  resuscitation (DNAR) orders.  What are a health care proxy and medical POA?  A health care proxy is also called a health care agent. It's a person you choose to make medical decisions for you when you can't make them for yourself. In most cases, a proxy is a trusted friend or family member.  A medical POA is legal paperwork that names your proxy. It may need to be:  Signed.  Notarized.  Dated.  Copied.  Witnessed.  Added to your medical record.  You may also want to choose someone to handle your money if you can't do so. This is called a durable POA for finances. It's separate from a medical POA. You may choose your health care proxy or someone else to act as your agent in money matters.  If you don't have a proxy, or if the proxy may not be acting in your best interest, a court may choose a guardian to act on your behalf.  What is a living will?  A living will is legal paperwork that states your wishes about medical care. Providers should keep a copy of it in your medical record. You may want to give a copy to family members or friends. You can also keep a card in your wallet to let loved ones know you have a living will and where they can find it. A living will may be used if:  You're very sick with something that will end your life.  You become disabled.  You can't make decisions or speak for yourself.  Your living will should include whether:  To use or not use life support equipment. This may include machines to filter your blood or to help you breathe.  You want a DNR or DNAR order. This tells providers not to use CPR if your heart or breathing stops.  To use or not use tube feeding.  You want to be given foods and fluids.  You want a type of comfort care called palliative care. This may be given when the goal for treatment becomes comfort rather than a cure.  You want to donate your organs and tissues.  A living will doesn't say what to do with your money and property if you pass away.  What is a DNR or  DNAR?  A DNR or DNAR order is a request not to have CPR. If you don't have one of these orders, a provider will try to help you if your heart stops or you stop breathing.   If you plan to have surgery, talk with your provider about your DNR or DNAR order.  What happens if I don't have an advance directive?  Each state has its own laws about advance directives. Some states assign family decision makers to act on your behalf if you don't have an advance directive.   Check with your provider, , or state representative about the laws in your state.  Where to find more information  Each state has its own laws about advance directives. You can look up these laws at: [email or web address]  This information is not intended to replace advice given to you by your health care provider. Make sure you discuss any questions you have with your health care provider.  Document Revised: 05/06/2024 Document Reviewed: 05/06/2024  Elsevier Patient Education © 2024 [employer].

## 2025-06-13 NOTE — LETTER
[facility]  Vaccine Consent Form    Patient Name:  Yasmine Berry  Patient :  1975  MRN: [id number]   Vaccine(s) Ordered    Pneumococcal Conjugate Vaccine 21-Valent All  Shingrix Vaccine        Screening Checklist  The following questions should be completed prior to vaccination. If you answer “yes” to any question, it does not necessarily mean you should not be vaccinated. It just means we may need to clarify or ask more questions. If a question is unclear, please ask your healthcare provider to explain it.    Yes No   Any fever or moderate to severe illness today (mild illness and/or antibiotic treatment are not contraindications)?     Do you have a history of a serious reaction to any previous vaccinations, such as anaphylaxis, encephalopathy within 7 days, Guillain-Lancaster syndrome within 6 weeks, seizure?     Have you received any live vaccine(s) (e.g MMR, KOLBY) or any other vaccines in the last month (to ensure duplicate doses aren't given)?     Do you have an anaphylactic allergy to latex (DTaP, DTaP-IPV, Hep A, Hep B, MenB, RV, Td, Tdap), baker’s yeast (Hep B, HPV), polysorbates (RSV, nirsevimab, PCV 20 and 21, Rotavirrus, Tdap, Shingrix), or gelatin (KOLBY, MMR)?     Do you have an anaphylactic allergy to neomycin (Rabies, KOLBY, MMR, IPV, Hep A), polymyxin B (IPV), or streptomycin (IPV)?      Any cancer, leukemia, AIDS, or other immune system disorder? (KOLBY, MMR, RV)     Do you have a parent, brother, or sister with an immune system problem (if immune competence of vaccine recipient clinically verified, can proceed)? (MMR, KOLBY)     Any recent steroid treatments for >2 weeks, chemotherapy, or radiation treatment? (KOLBY, MMR)     Have you received antibody-containing blood transfusions or IVIG in the past 11 months (recommended interval is dependent on product)? (MMR, KOLBY)     Have you taken antiviral drugs (acyclovir, famciclovir, valacyclovir for KOLBY) in the last 24 or 48 hours, respectively?      Are  "you pregnant or planning to become pregnant within 1 month? (KOLBY, MMR, HPV, IPV, MenB, Abrexvy; For Hep B- refer to Engerix-B; For RSV - Abrysvo is indicated for 32-36 weeks of pregnancy from September to January)     For infants, have you ever been told your child has had intussusception or a medical emergency involving obstruction of the intestine (Rotavirus)? If not for an infant, can skip this question.         *Ordering Physicians/APC should be consulted if \"yes\" is checked by the patient or guardian above.  I have received, read, and understand the Vaccine Information Statement (VIS) for each vaccine ordered.  I have considered my or my child's health status as well as the health status of my close contacts.  I have taken the opportunity to discuss my vaccine questions with my or my child's health care provider.   I have requested that the ordered vaccine(s) be given to me or my child.  I understand the benefits and risks of the vaccines.  I understand that I should remain in the clinic for 15 minutes after receiving the vaccine(s).  _________________________________________________________  Signature of Patient or Parent/Legal Guardian ____________________  Date   "

## 2025-06-13 NOTE — PROGRESS NOTES
Office Note     Name: Yasmine Berry    : 1975     MRN: [id number]     Chief Complaint  Annual Exam and Gynecologic Exam    Subjective     History of Present Illness:  Yasmine Berry is a 50 y.o. female who presents today for routine physical.  Past medical history significant for hormone replacement therapy, anxiety, vitamin D deficiency.    History of Present Illness  The patient presents for a physical exam.    She received her Tdap vaccine in 2019 but did not receive the COVID-19 vaccine last year. She has expressed interest in receiving both the pneumococcal pneumonia and shingles vaccines today. She has been attempting to schedule a mammogram at [facility] but has not received a response.    She reports regular menstrual cycles and no bowel irregularities. She does not perform regular breast self-examinations but reports no breast pain. She reports no chest pain or shortness of breath. She is currently fasting and has requested a refill of her B12 prescription, which she has been without for several months. She is on estradiol patch 0.25 mg and progesterone 200 mg nightly.    She experienced a severe sore throat last week, which resolved after 4 to 5 days. She sought medical attention at a walk-in clinic where strep was ruled out. She reports no difficulty swallowing.    She reports that her stress levels have improved compared to previous years. Her sleep quality is satisfactory, although she experiences vivid dreams. She is on Lexapro in the morning.    She has been diagnosed with hammertoe by a podiatrist in [place], which causes her discomfort. She is seeking a second opinion regarding this diagnosis. She has previously consulted with Dr. Berry at [facility] but had an unsatisfactory experience. She was advised to engage in Pilates and exercise to improve her foot condition, but she found this challenging due to her inability to move her foot. She is concerned about the  potential impact of her new job, which requires extensive walking, on her foot condition.    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    FAMILY HISTORY  She has a family history of diabetes.    Review of Systems:   Review of Systems   Constitutional:  Negative for activity change and appetite change.   HENT:  Positive for sore throat.    Respiratory:  Negative for shortness of breath.    Cardiovascular:  Negative for chest pain, palpitations and leg swelling.   Gastrointestinal:  Negative for abdominal distention and abdominal pain.   Genitourinary:  Negative for breast lump, breast pain and menstrual problem.   Allergic/Immunologic: Negative for environmental allergies.   Psychiatric/Behavioral:  Negative for sleep disturbance and stress.        Past Medical History:   Past Medical History:   Diagnosis Date   • Anxiety    • Hypertension    • Plantar plate injury, right, initial encounter    • Urinary tract infection 12/01/20   • Vitamin D deficiency        Past Surgical History:   Past Surgical History:   Procedure Laterality Date   • BREAST AUGMENTATION Bilateral    • ECHO - CONVERTED  02/24/2021    EF 70%. Trace-Mild MR. RVSP- 27 mmHg   • PLANTAR FASCIA SURGERY Right        Family History:   Family History   Problem Relation Age of Onset   • Hypertension Mother    • Hyperlipidemia Mother    • Transient ischemic attack Mother 60   • Hypertension Father    • Heart disease Father 50        stent placement   • No Known Problems Sister    • Asthma Brother    • Hypertension Maternal Grandmother    • Diabetes Paternal Grandmother    • Hypertension Paternal Grandmother    • Hyperlipidemia Paternal Grandmother    • Heart attack Paternal Grandfather    • No Known Problems Son    • No Known Problems Daughter        Social History:   Social History     Socioeconomic History   • Marital status:    Tobacco Use   • Smoking status: Never   • Smokeless  "tobacco: Never   Vaping Use   • Vaping status: Never Used   Substance and Sexual Activity   • Alcohol use: Yes     Comment: occasionally   • Drug use: Never   • Sexual activity: Yes     Partners: Male     Birth control/protection: Surgical     Comment: vasectomy       Immunizations:   Immunization History   Administered Date(s) Administered   • 31-influenza Vac Quardvalent Preservativ 11/07/2019   • COVID-19 (MODERNA) 12YRS+ (SPIKEVAX) 11/11/2023   • COVID-19 (MODERNA) 1st,2nd,3rd Dose Monovalent 03/05/2021, 04/02/2021, 11/20/2021   • COVID-19 (MODERNA) BIVALENT 12+YRS 10/01/2022   • COVID-19 (MODERNA) Monovalent Original Booster 10/15/2021   • COVID-19 (UNSPECIFIED) 10/15/2021   • Flu Vaccine Intradermal Quad 18-64YR 09/30/2021   • Fluzone  >6mos 12/02/2010   • Fluzone (or Fluarix & Flulaval for VFC) >6mos 09/26/2022   • Fluzone Quad >6mos (Multi-dose) 03/01/2018   • Hepatitis A 11/07/2019   • Influenza Seasonal Injectable 09/30/2021   • PCV21 (CAPVAXIVE) 06/13/2025   • Shingrix 06/13/2025   • Tdap 05/15/2019        Medications:     Current Outpatient Medications:   •  Cyanocobalamin 1000 MCG/ML kit, Inject 1 mL into the appropriate muscle as directed by prescriber Every 30 (Thirty) Days., Disp: 1 mL, Rfl: 5  •  escitalopram (LEXAPRO) 10 MG tablet, Take 1 tablet by mouth Daily., Disp: 90 tablet, Rfl: 2  •  Progesterone (PROMETRIUM) 200 MG capsule, TAKE ONE CAPSULE NIGHTLY, Disp: 90 capsule, Rfl: 1  •  [START ON 6/16/2025] estradiol (Vivelle-Dot) 0.05 MG/24HR patch, Place 1 patch on the skin as directed by provider 2 (Two) Times a Week., Disp: 8 patch, Rfl: 5  •  Syringe 25G X 1\" 3 ML misc, Use to inject vitamin b12 injection, Disp: 10 each, Rfl: 0    Allergies:   No Known Allergies    Objective     Vital Signs  /84   Pulse 66   Temp 98.1 °F (36.7 °C) (Infrared)   Ht 162 cm (63.78\")   Wt 66.2 kg (146 lb)   SpO2 100%   BMI 25.23 kg/m²   Body mass index is 25.23 kg/m².     BMI is >= 25 and <30. " (Overweight) The following options were offered after discussion;: weight loss educational material (shared in after visit summary), exercise counseling/recommendations, and nutrition counseling/recommendations       Physical Exam  Vitals reviewed. Exam conducted with a chaperone present.   Constitutional:       Appearance: Normal appearance. She is well-developed.   HENT:      Head: Normocephalic and atraumatic.      Right Ear: Hearing, tympanic membrane, ear canal and external ear normal.      Left Ear: Hearing, tympanic membrane, ear canal and external ear normal.      Nose: Nose normal.      Mouth/Throat:      Pharynx: Uvula midline.   Eyes:      General: Lids are normal.      Conjunctiva/sclera: Conjunctivae normal.      Pupils: Pupils are equal, round, and reactive to light.   Cardiovascular:      Rate and Rhythm: Normal rate and regular rhythm.      Heart sounds: Normal heart sounds.   Pulmonary:      Effort: Pulmonary effort is normal.      Breath sounds: Normal breath sounds.   Chest:   Breasts:     Right: Normal. No tenderness.      Left: Normal. No tenderness.      Comments: Bilateral implants  Abdominal:      General: Bowel sounds are normal.      Palpations: Abdomen is soft.   Genitourinary:     Vagina: Normal.      Cervix: Normal.      Uterus: Normal.       Adnexa: Right adnexa normal and left adnexa normal.   Musculoskeletal:         General: Normal range of motion.      Cervical back: Full passive range of motion without pain, normal range of motion and neck supple.   Skin:     General: Skin is warm and dry.   Neurological:      Mental Status: She is alert and oriented to person, place, and time.      Deep Tendon Reflexes: Reflexes are normal and symmetric.   Psychiatric:         Speech: Speech normal.         Behavior: Behavior normal.         Thought Content: Thought content normal.         Judgment: Judgment normal.        ECG 12 Lead    Date/Time: 6/13/2025 1:34 PM  Performed by: La Charles  DIEGO Berry    Authorized by: La Charles PA-C  Rhythm: sinus rhythm  BPM: 68    Clinical impression: normal ECG  Comments: Sinus rhythm with ventricular rate of 68 bpm         Results:  No results found for this or any previous visit (from the past 24 hours).     Assessment and Plan     Assessment/Plan:  Diagnoses and all orders for this visit:    1. Routine medical exam (Primary)  Overview:  She is fully vaccinated to COVID.  Discussed labs, questions answered.    She is current on age related screenings.  Continue regular cardiovascular exercise routine.  Continue low fat, low cholesterol diet.      Orders:  -     LIQUID-BASED PAP SMEAR WITH HPV GENOTYPING IF ASCUS ([facility]); Future  -     LIQUID-BASED PAP SMEAR WITH HPV GENOTYPING IF ASCUS ([facility])    2. Encounter for screening mammogram for malignant neoplasm of breast  -     Mammo Screening Digital Tomosynthesis Bilateral With CAD; Future    3. Screening for colon cancer  -     Ambulatory Referral For Screening Colonoscopy    4. Hot flashes due to menopause  Overview:  Discussed with patient.  She can increase to 0.05 mg patches twice weekly.  Will try to stay on the lowest dose possible.    Orders:  -     estradiol (Vivelle-Dot) 0.05 MG/24HR patch; Place 1 patch on the skin as directed by provider 2 (Two) Times a Week.  Dispense: 8 patch; Refill: 5    5. Diabetes mellitus screening  -     CBC & Differential; Future  -     Comprehensive Metabolic Panel; Future  -     Hemoglobin A1c; Future    6. Lipid screening  -     Lipid Panel; Future    7. Thyroid disorder screening  -     TSH; Future  -     T4, Free; Future  -     T3, Free; Future    8. Encounter for vitamin deficiency screening  -     Vitamin B12; Future  -     Vitamin D,25-Hydroxy; Future    9. Right foot pain  -     Ambulatory Referral to Orthopedic Surgery    Other orders  -     Pneumococcal Conjugate Vaccine 21-Valent All  -     Shingrix Vaccine  -     Progesterone (PROMETRIUM)  200 MG capsule; TAKE ONE CAPSULE NIGHTLY  Dispense: 90 capsule; Refill: 1        Assessment & Plan  1. Health maintenance.  Her estradiol levels are significantly low. EKG shows normal sinus rhythm at 68 bpm. She will receive the pneumococcal pneumonia and shingles vaccines today. A mammogram and colonoscopy have been ordered. A Pap smear will be conducted during this visit. Her estradiol patch dosage will be increased to 0.05 mg. A comprehensive set of labs, including CBC, CMP, lipid panel, TSH, T3, T4, B12, vitamin D, and A1c, will be ordered.    2. Sinus issues.  She reported having a severe sore throat last week, which resolved in about four to five days. She visited a walk-in clinic, and the test for strep throat was negative. No further action is required as the symptoms have resolved.    3. Stress management.  She reports that her stress levels have improved compared to previous years. She is currently taking Lexapro in the morning.    4. Hammertoe.  She has been diagnosed with hammertoe by a podiatrist in [place], which causes her discomfort. She is seeking a second opinion regarding this diagnosis. A referral to a different podiatrist will be arranged.       Follow Up  No follow-ups on file.    Patient or patient representative verbalized consent for the use of Ambient Listening during the visit with  La Charles PA-C for chart documentation. 6/13/2025  13:05 EDT      La Charles PA-C   [facility] [place] [id number]

## 2025-06-13 NOTE — TELEPHONE ENCOUNTER
Rx Refill Note  Requested Prescriptions     Pending Prescriptions Disp Refills    escitalopram (LEXAPRO) 10 MG tablet 90 tablet 2     Sig: Take 1 tablet by mouth Daily.      Last office visit with prescribing clinician: 6/13/2025   Last telemedicine visit with prescribing clinician: Visit date not found   Next office visit with prescribing clinician: Visit date not found                         Would you like a call back once the refill request has been completed: [] Yes [] No    If the office needs to give you a call back, can they leave a voicemail: [] Yes [] No    Edelmira Mcneil MA  06/13/25, 14:14 EDT

## 2025-06-16 ENCOUNTER — RESULTS FOLLOW-UP (OUTPATIENT)
Dept: INTERNAL MEDICINE | Facility: CLINIC | Age: 50
End: 2025-06-16
Payer: COMMERCIAL

## 2025-06-16 LAB — REF LAB TEST METHOD: NORMAL

## 2025-06-27 ENCOUNTER — TELEMEDICINE (OUTPATIENT)
Dept: FAMILY MEDICINE CLINIC | Facility: TELEHEALTH | Age: 50
End: 2025-06-27
Payer: COMMERCIAL

## 2025-06-27 VITALS — WEIGHT: 145 LBS | BODY MASS INDEX: 25.06 KG/M2

## 2025-06-27 DIAGNOSIS — R39.89 SUSPECTED UTI: Primary | ICD-10-CM

## 2025-06-27 PROCEDURE — 99213 OFFICE O/P EST LOW 20 MIN: CPT | Performed by: NURSE PRACTITIONER

## 2025-06-27 RX ORDER — NITROFURANTOIN 25; 75 MG/1; MG/1
100 CAPSULE ORAL 2 TIMES DAILY
Qty: 10 CAPSULE | Refills: 0 | Status: SHIPPED | OUTPATIENT
Start: 2025-06-27 | End: 2025-07-02

## 2025-06-27 RX ORDER — PHENAZOPYRIDINE HYDROCHLORIDE 200 MG/1
200 TABLET, FILM COATED ORAL 3 TIMES DAILY PRN
Qty: 6 TABLET | Refills: 0 | Status: SHIPPED | OUTPATIENT
Start: 2025-06-27 | End: 2025-06-29

## 2025-06-28 PROBLEM — M25.529 PAIN IN ELBOW: Status: ACTIVE | Noted: 2022-02-07

## 2025-06-28 NOTE — PROGRESS NOTES
You have chosen to receive care through a telehealth visit.  Do you consent to use a video/audio connection for your medical care today? Yes     CHIEF COMPLAINT  No chief complaint on file.        HPI  Yasmine Berry is a 50 y.o. female  presents with complaint of burning, urgency and bladder pressure Reports started 2 days ago. Reports she has had a UTI before and the symptoms feel the same. Reports no fever or chills. No nausea or vomiting. Reports no back pain. No abdominal pain. Reports she has taken cranberry pills and motrin. Reports these medication helped alittle.     Review of Systems   Constitutional:  Negative for chills, fatigue and fever.   HENT:  Negative for congestion, ear discharge, ear pain, sinus pressure, sinus pain and sore throat.    Respiratory:  Negative for cough, chest tightness, shortness of breath and wheezing.    Cardiovascular:  Negative for chest pain.   Gastrointestinal:  Negative for abdominal pain, diarrhea, nausea and vomiting.   Genitourinary:  Positive for dysuria and urgency. Negative for flank pain and frequency.        Bladder pressure   Musculoskeletal:  Negative for back pain and myalgias.   Neurological:  Negative for dizziness and headaches.   Psychiatric/Behavioral: Negative.         Past Medical History:   Diagnosis Date    Anxiety     Hypertension     Plantar plate injury, right, initial encounter     Urinary tract infection 12/01/20    Vitamin D deficiency        Family History   Problem Relation Age of Onset    Hypertension Mother     Hyperlipidemia Mother     Transient ischemic attack Mother 60    Hypertension Father     Heart disease Father 50        stent placement    No Known Problems Sister     Asthma Brother     Hypertension Maternal Grandmother     Diabetes Paternal Grandmother     Hypertension Paternal Grandmother     Hyperlipidemia Paternal Grandmother     Heart attack Paternal Grandfather     No Known Problems Son     No Known Problems Daughter         Social History     Socioeconomic History    Marital status:    Tobacco Use    Smoking status: Never    Smokeless tobacco: Never   Vaping Use    Vaping status: Never Used   Substance and Sexual Activity    Alcohol use: Yes     Comment: occasionally    Drug use: Never    Sexual activity: Yes     Partners: Male     Birth control/protection: Surgical     Comment: vasectomy       Yasmine Berry  reports that she has never smoked. She has never used smokeless tobacco. I have educated her on the risk of diseases from using tobacco products such as cancer, COPD, and heart disease.         I spent 1 minutes counseling the patient.              Wt 65.8 kg (145 lb)   LMP 05/29/2025   Breastfeeding No   BMI 25.06 kg/m²     PHYSICAL EXAM  Physical Exam   Constitutional: She is oriented to person, place, and time. She appears well-developed and well-nourished. She does not have a sickly appearance. No distress.   HENT:   Head: Normocephalic and atraumatic.   Right Ear: Hearing normal.   Left Ear: Hearing normal.   Nose: No congestion.   Mouth/Throat: Mouth/Lips are normal.  Eyes: Conjunctivae and lids are normal.   Pulmonary/Chest: Effort normal.  No respiratory distress.  Abdominal: There is abdominal tenderness in the suprapubic area.   Patient directed exam  Bladder pressure with deep palpation  No back pain with deep palpation.    Neurological: She is alert and oriented to person, place, and time.   Psychiatric: She has a normal mood and affect. Her speech is normal and behavior is normal.       Results for orders placed or performed in visit on 06/13/25   Comprehensive Metabolic Panel    Collection Time: 06/13/25 10:58 AM    Specimen: Blood   Result Value Ref Range    Glucose 79 65 - 99 mg/dL    BUN 8.0 6.0 - 20.0 mg/dL    Creatinine 0.82 0.57 - 1.00 mg/dL    Sodium 137 136 - 145 mmol/L    Potassium 3.9 3.5 - 5.2 mmol/L    Chloride 102 98 - 107 mmol/L    CO2 24.3 22.0 - 29.0 mmol/L    Calcium 9.2 8.6 - 10.5  mg/dL    Total Protein 7.1 6.0 - 8.5 g/dL    Albumin 4.3 3.5 - 5.2 g/dL    ALT (SGPT) 12 1 - 33 U/L    AST (SGOT) 19 1 - 32 U/L    Alkaline Phosphatase 66 39 - 117 U/L    Total Bilirubin 0.4 0.0 - 1.2 mg/dL    Globulin 2.8 gm/dL    A/G Ratio 1.5 g/dL    BUN/Creatinine Ratio 9.8 7.0 - 25.0    Anion Gap 10.7 5.0 - 15.0 mmol/L    eGFR 87.3 >60.0 mL/min/1.73   Lipid Panel    Collection Time: 06/13/25 10:58 AM    Specimen: Blood   Result Value Ref Range    Total Cholesterol 198 0 - 200 mg/dL    Triglycerides 57 0 - 150 mg/dL    HDL Cholesterol 65 (H) 40 - 60 mg/dL    LDL Cholesterol  122 (H) 0 - 100 mg/dL    VLDL Cholesterol 11 5 - 40 mg/dL    LDL/HDL Ratio 1.87    TSH    Collection Time: 06/13/25 10:58 AM    Specimen: Blood   Result Value Ref Range    TSH 0.856 0.270 - 4.200 uIU/mL   T4, Free    Collection Time: 06/13/25 10:58 AM    Specimen: Blood   Result Value Ref Range    Free T4 1.00 0.92 - 1.68 ng/dL   T3, Free    Collection Time: 06/13/25 10:58 AM    Specimen: Blood   Result Value Ref Range    T3, Free 2.74 2.00 - 4.40 pg/mL   Vitamin B12    Collection Time: 06/13/25 10:58 AM    Specimen: Blood   Result Value Ref Range    Vitamin B-12 636 211 - 946 pg/mL   Vitamin D,25-Hydroxy    Collection Time: 06/13/25 10:58 AM    Specimen: Blood   Result Value Ref Range    25 Hydroxy, Vitamin D 39.9 30.0 - 100.0 ng/ml   Hemoglobin A1c    Collection Time: 06/13/25 10:58 AM    Specimen: Blood   Result Value Ref Range    Hemoglobin A1C 5.10 4.80 - 5.60 %   CBC Auto Differential    Collection Time: 06/13/25 10:58 AM    Specimen: Blood   Result Value Ref Range    WBC 4.46 3.40 - 10.80 10*3/mm3    RBC 4.52 3.77 - 5.28 10*6/mm3    Hemoglobin 13.6 12.0 - 15.9 g/dL    Hematocrit 41.1 34.0 - 46.6 %    MCV 90.9 79.0 - 97.0 fL    MCH 30.1 26.6 - 33.0 pg    MCHC 33.1 31.5 - 35.7 g/dL    RDW 12.0 (L) 12.3 - 15.4 %    RDW-SD 40.0 37.0 - 54.0 fl    MPV 11.6 6.0 - 12.0 fL    Platelets 255 140 - 450 10*3/mm3    Neutrophil % 50.2 42.7 - 76.0 %     Lymphocyte % 39.5 19.6 - 45.3 %    Monocyte % 8.3 5.0 - 12.0 %    Eosinophil % 0.9 0.3 - 6.2 %    Basophil % 0.9 0.0 - 1.5 %    Immature Grans % 0.2 0.0 - 0.5 %    Neutrophils, Absolute 2.24 1.70 - 7.00 10*3/mm3    Lymphocytes, Absolute 1.76 0.70 - 3.10 10*3/mm3    Monocytes, Absolute 0.37 0.10 - 0.90 10*3/mm3    Eosinophils, Absolute 0.04 0.00 - 0.40 10*3/mm3    Basophils, Absolute 0.04 0.00 - 0.20 10*3/mm3    Immature Grans, Absolute 0.01 0.00 - 0.05 10*3/mm3    nRBC 0.0 0.0 - 0.2 /100 WBC       Diagnoses and all orders for this visit:    1. Suspected UTI (Primary)    Other orders  -     nitrofurantoin, macrocrystal-monohydrate, (Macrobid) 100 MG capsule; Take 1 capsule by mouth 2 (Two) Times a Day for 5 days.  Dispense: 10 capsule; Refill: 0  -     phenazopyridine (Pyridium) 200 MG tablet; Take 1 tablet by mouth 3 (Three) Times a Day As Needed for Bladder Spasms or Dysuria for up to 2 days.  Dispense: 6 tablet; Refill: 0    Rest  Increase water   Declines UA and urine culture- patient is 1.5 hours away from [facility] [place] [place] [facility]  PCP if symptoms persist  ER for any worsening symptoms such as high fever, abdominal pain or nausea vomiting       FOLLOW-UP  As discussed during visit with PCP/[facility] if no improvement or Urgent Care/Emergency Department if worsening of symptoms    Patient verbalizes understanding of medication dosage, comfort measures, instructions for treatment and follow-up.    Kayleigh Clemons, APRN  06/27/2025  22:55 EDT    Mode of Visit: Video  Location of patient: -HOME-  Location of provider: +HOME+  You have chosen to receive care through a telehealth visit.  The patient has signed the video visit consent form.  The visit included audio and video interaction. No technical issues occurred during this visit.      The use of a video visit has been reviewed with the patient and verbal informed consent has been obtained. Myself and Yasmine Berry participated in this visit. The patient is  located in [address] [place] [id number].   I am located in [facility]. [employer] and [employer] were utilized. I spent 5 minutes in the patient's chart for this visit.         Note Disclaimer: At [facility], we believe that sharing information builds trust and better   relationships. You are receiving this note because you recently visited [facility]. It is possible you   will see health information before a provider has talked with you about it. This kind of information can   be easy to misunderstand. To help you fully understand what it means for your health, we urge you to   discuss this note with your provider.

## 2025-07-07 ENCOUNTER — OFFICE VISIT (OUTPATIENT)
Dept: ORTHOPEDIC SURGERY | Facility: CLINIC | Age: 50
End: 2025-07-07
Payer: COMMERCIAL

## 2025-07-07 VITALS
WEIGHT: 145 LBS | HEIGHT: 64 IN | BODY MASS INDEX: 24.75 KG/M2 | DIASTOLIC BLOOD PRESSURE: 84 MMHG | SYSTOLIC BLOOD PRESSURE: 128 MMHG

## 2025-07-07 DIAGNOSIS — M79.89 TOE SWELLING: Primary | ICD-10-CM

## 2025-07-07 NOTE — PROGRESS NOTES
[facility] [place] [facility] Office Visit     Office Visit       Date: 07/07/2025   Patient Name: Yasmine Berry  MRN: [id number]  YOB: 1975    Referring Physician: [place], *     Chief Complaint:   Chief Complaint   Patient presents with    Right Foot - Pain       History of Present Illness: Yasmine Berry is a 50 y.o. female who is here today for chief complaint of right third toe swelling and discomfort.  States has been going on since the end of last year.  Does not really have anything that makes it worse or better.  Of significance in 2019 had right second toe plantar plate repair for which patient states she made a normal recovery and is happy with her surgical outcome.  Works as a nurse in the medical ICU.  Denies smoking or nicotine usage.  Denies any significant medical comorbidities.    Subjective   Review of Systems: Review of Systems   Constitutional: Negative.    HENT: Negative.     Eyes: Negative.    Respiratory: Negative.     Cardiovascular: Negative.    Gastrointestinal: Negative.    Endocrine: Negative.    Genitourinary: Negative.    Musculoskeletal:  Positive for arthralgias.   Skin: Negative.    Allergic/Immunologic: Negative.    Neurological: Negative.    Hematological: Negative.    Psychiatric/Behavioral: Negative.          Past Medical History:   Past Medical History:   Diagnosis Date    Anxiety     Hypertension     Plantar plate injury, right, initial encounter     Scoliosis     Tennis elbow     Urinary tract infection 12/01/20    Varicella 12/01/1983    Vitamin D deficiency        Past Surgical History:   Past Surgical History:   Procedure Laterality Date    BREAST AUGMENTATION Bilateral     BREAST SURGERY  05/23    ECHO - CONVERTED  02/24/2021    EF 70%. Trace-Mild  RVSP- 27 mmHg    FOOT SURGERY  2019    planter plate repair    PLANTAR FASCIA SURGERY Right     WISDOM TOOTH EXTRACTION  06/01/1993       Family History:  "  Family History   Problem Relation Age of Onset    Hypertension Mother     Hyperlipidemia Mother     Transient ischemic attack Mother 60    Hypertension Father     Heart disease Father 50        stent placement    No Known Problems Sister     Asthma Brother     Hypertension Maternal Grandmother     Diabetes Paternal Grandmother     Hypertension Paternal Grandmother     Hyperlipidemia Paternal Grandmother     Heart attack Paternal Grandfather     No Known Problems Son     No Known Problems Daughter        Social History:   Social History     Socioeconomic History    Marital status:    Tobacco Use    Smoking status: Never    Smokeless tobacco: Never   Vaping Use    Vaping status: Never Used   Substance and Sexual Activity    Alcohol use: Yes     Alcohol/week: 1.0 - 2.0 standard drink of alcohol     Types: 1 - 2 Glasses of wine per week     Comment: occasionally    Drug use: Never    Sexual activity: Yes     Partners: Male     Birth control/protection: Vasectomy     Comment: vasectomy       Medications:   Current Outpatient Medications:     Cyanocobalamin 1000 MCG/ML kit, Inject 1 mL into the appropriate muscle as directed by prescriber Every 30 (Thirty) Days., Disp: 1 mL, Rfl: 5    escitalopram (LEXAPRO) 10 MG tablet, Take 1 tablet by mouth Daily., Disp: 90 tablet, Rfl: 2    estradiol (Vivelle-Dot) 0.05 MG/24HR patch, Place 1 patch on the skin as directed by provider 2 (Two) Times a Week., Disp: 8 patch, Rfl: 5    Progesterone (PROMETRIUM) 200 MG capsule, TAKE ONE CAPSULE NIGHTLY, Disp: 90 capsule, Rfl: 1    Syringe 25G X 1\" 3 ML misc, Use to inject vitamin b12 injection, Disp: 10 each, Rfl: 0    Allergies: No Known Allergies    I reviewed the patient's chief complaint, history of present illness, review of systems, past medical history, surgical history, family history, social history, medications and allergy list.     Objective    Vital Signs:   Vitals:    07/07/25 1031   BP: 128/84   Weight: 65.8 kg (145 " "lb)   Height: 161.3 cm (63.5\")     Body mass index is 25.28 kg/m².    Ortho Exam:  right LE Foot and Ankle Exam:   Plantigrade foot.   There is good perfusion to the toes.   The skin is intact throughout the foot and ankle.  Range of motion of ankle, foot and toes is within normal limits.   Incision over second MTP joint area well-healed.  There is some swelling about the third toe relative to the others.  There is no erythema.  Minimal tenderness.  No signs of infection.    Results Review:   XR FOOT 3+ VW RIGHT     Date of Exam: 12/12/2024 1:21 PM EST     Indication: right foot pain     Comparison: None available.     Findings:  There is osteoarthritic change at the second metatarsophalangeal joint. There is a surgical screw in the second metatarsal head. Unremarkable appearance of the soft tissues. No acute fracture or traumatic malalignment. No bony erosion. No periostitis.     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 There is a surgical screw in the second metatarsal head. Unremarkable appearance of the soft tissues. No acute fracture or traumatic malalignment. No bony erosion. No periostitis.        Electronically Signed: Tommy Silva MD    12/16/2024 12:33 PM EST    Workstation ID: [id number]    07/07/25 I have personally reviewed and interpreted the images from outside facility with the documented findings, degenerative changes visible second MTP joint, appearance of soft tissue swelling about the third toe        Assessment / Plan    Assessment/Plan:   Diagnoses and all orders for this visit:    1. Toe swelling (Primary)      Discussed right third toe swelling from (undiagnosed new problem with uncertain prognosis) with patient as well as treatment options at length. Decision regarding surgical intervention considered. Patient is not a candidate due to trial of nonoperative management. Also reviewed external note and imaging studies from December 16 " had a long conversation with regard to her third toe swelling. 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  We discussed laboratory testing to help identify etiology of swelling as well as possible referral to rheumatology.  Patient elected to continue conservative management at this time.  Discussed that she would contact the clinic if she would like to pursue rheumatologic follow-up at any point in time.  Was a pleasure seeing her today.    Follow Up:   Return for Recheck.      Jose J Noe MD  [facility] [place] [facility]

## 2025-07-07 NOTE — PATIENT INSTRUCTIONS
"[employer]                  Online:  [email or web address]  [email or web address]  [email or web address]    [place], [place]:   [employer] [place]   [address], [address], [facility] [address]   [email or web address]      [employer]   [address] , [facility], [address]   [email or web address]      [employer]   [address], [place] [address]   [email or web address]      [employer]   [address] , [address], [place] [address]   [email or web address]     [employer]  [address] [place] [address]  [email or web address]  P: [phone]    Toe Spacers        Go to Amazon.com and type in \"gel toe spacer\"                "

## 2025-07-15 LAB
NCCN CRITERIA FLAG: NORMAL
TYRER CUZICK SCORE: 8.2

## 2025-07-18 ENCOUNTER — HOSPITAL ENCOUNTER (OUTPATIENT)
Dept: MAMMOGRAPHY | Facility: HOSPITAL | Age: 50
Discharge: HOME | End: 2025-07-18
Admitting: PHYSICIAN ASSISTANT
Payer: COMMERCIAL

## 2025-07-18 DIAGNOSIS — Z12.31 ENCOUNTER FOR SCREENING MAMMOGRAM FOR MALIGNANT NEOPLASM OF BREAST: ICD-10-CM

## 2025-07-18 PROCEDURE — 77063 BREAST TOMOSYNTHESIS BI: CPT

## 2025-07-18 PROCEDURE — 77067 SCR MAMMO BI INCL CAD: CPT

## 2025-08-07 ENCOUNTER — HOSPITAL ENCOUNTER (OUTPATIENT)
Dept: MAMMOGRAPHY | Facility: HOSPITAL | Age: 50
Discharge: HOME | End: 2025-08-07
Payer: COMMERCIAL

## 2025-08-07 ENCOUNTER — HOSPITAL ENCOUNTER (OUTPATIENT)
Dept: ULTRASOUND IMAGING | Facility: HOSPITAL | Age: 50
Discharge: HOME | End: 2025-08-07
Payer: COMMERCIAL

## 2025-08-07 DIAGNOSIS — R92.8 ABNORMAL MAMMOGRAM: ICD-10-CM

## 2025-08-07 PROCEDURE — 77065 DX MAMMO INCL CAD UNI: CPT

## 2025-08-07 PROCEDURE — 76642 ULTRASOUND BREAST LIMITED: CPT

## 2025-08-07 PROCEDURE — G0279 TOMOSYNTHESIS, MAMMO: HCPCS

## 2025-08-11 RX ORDER — SODIUM, POTASSIUM,MAG SULFATES 17.5-3.13G
SOLUTION, RECONSTITUTED, ORAL ORAL
Qty: 354 ML | Refills: 0 | Status: SHIPPED | OUTPATIENT
Start: 2025-08-11

## 2025-08-15 ENCOUNTER — LAB REQUISITION (OUTPATIENT)
Dept: LAB | Facility: HOSPITAL | Age: 50
End: 2025-08-15
Payer: COMMERCIAL

## 2025-08-15 ENCOUNTER — OUTSIDE FACILITY SERVICE (OUTPATIENT)
Dept: GASTROENTEROLOGY | Facility: CLINIC | Age: 50
End: 2025-08-15
Payer: COMMERCIAL

## 2025-08-15 DIAGNOSIS — D12.2 BENIGN NEOPLASM OF ASCENDING COLON: ICD-10-CM

## 2025-08-15 DIAGNOSIS — Z12.11 ENCOUNTER FOR SCREENING FOR MALIGNANT NEOPLASM OF COLON: ICD-10-CM

## 2025-08-15 DIAGNOSIS — K64.8 OTHER HEMORRHOIDS: ICD-10-CM

## 2025-08-15 DIAGNOSIS — K57.30 DIVERTICULOSIS OF LARGE INTESTINE WITHOUT PERFORATION OR ABSCESS WITHOUT BLEEDING: ICD-10-CM

## 2025-08-15 PROCEDURE — 88305 TISSUE EXAM BY PATHOLOGIST: CPT | Performed by: INTERNAL MEDICINE

## 2025-08-19 LAB
CYTO UR: NORMAL
LAB AP CASE REPORT: NORMAL
LAB AP CLINICAL INFORMATION: NORMAL
PATH REPORT.FINAL DX SPEC: NORMAL
PATH REPORT.GROSS SPEC: NORMAL

## 2025-08-23 ENCOUNTER — RESULTS FOLLOW-UP (OUTPATIENT)
Dept: LAB | Facility: HOSPITAL | Age: 50
End: 2025-08-23
Payer: COMMERCIAL